# Patient Record
Sex: FEMALE | Race: WHITE | URBAN - METROPOLITAN AREA
[De-identification: names, ages, dates, MRNs, and addresses within clinical notes are randomized per-mention and may not be internally consistent; named-entity substitution may affect disease eponyms.]

---

## 2021-06-17 ENCOUNTER — TELEPHONE (OUTPATIENT)
Dept: SURGERY | Age: 56
End: 2021-06-17

## 2021-06-17 NOTE — TELEPHONE ENCOUNTER
I returned the call mentioned below to the patient. I left a detailed message letting the patient know that we are unable to provide cosmetic pricing without an initial appointment. I will close this phone note.

## 2021-11-22 ENCOUNTER — OFFICE VISIT (OUTPATIENT)
Dept: SURGERY | Age: 56
End: 2021-11-22
Payer: COMMERCIAL

## 2021-11-22 VITALS
DIASTOLIC BLOOD PRESSURE: 92 MMHG | OXYGEN SATURATION: 98 % | HEART RATE: 82 BPM | SYSTOLIC BLOOD PRESSURE: 158 MMHG | HEIGHT: 59 IN | WEIGHT: 164 LBS | BODY MASS INDEX: 33.06 KG/M2

## 2021-11-22 DIAGNOSIS — R10.13 EPIGASTRIC PAIN: ICD-10-CM

## 2021-11-22 DIAGNOSIS — R10.9 ABDOMINAL PAIN, UNSPECIFIED ABDOMINAL LOCATION: ICD-10-CM

## 2021-11-22 DIAGNOSIS — L98.7 EXCESS SKIN OF THIGH: ICD-10-CM

## 2021-11-22 DIAGNOSIS — L98.7 EXCESS SKIN OF ARM: ICD-10-CM

## 2021-11-22 DIAGNOSIS — M79.3 PANNICULITIS: Primary | ICD-10-CM

## 2021-11-22 DIAGNOSIS — N64.81 BREAST PTOSIS: ICD-10-CM

## 2021-11-22 PROCEDURE — 99204 OFFICE O/P NEW MOD 45 MIN: CPT | Performed by: SURGERY

## 2021-11-22 RX ORDER — FUROSEMIDE 20 MG/1
20 TABLET ORAL 2 TIMES DAILY
COMMUNITY
Start: 2021-10-01

## 2021-11-22 RX ORDER — MIDODRINE HYDROCHLORIDE 5 MG/1
TABLET ORAL
Status: ON HOLD | COMMUNITY
Start: 2021-04-16 | End: 2022-10-25

## 2021-11-22 RX ORDER — NYSTATIN 100000 U/G
CREAM TOPICAL
COMMUNITY

## 2021-11-22 RX ORDER — HYDROXYZINE 50 MG/1
50 TABLET, FILM COATED ORAL NIGHTLY
COMMUNITY
Start: 2021-10-31

## 2021-11-22 RX ORDER — NALTREXONE HYDROCHLORIDE 50 MG/1
TABLET, FILM COATED ORAL
COMMUNITY
Start: 2021-09-08 | End: 2022-06-08 | Stop reason: ALTCHOICE

## 2021-11-22 RX ORDER — AZELASTINE HYDROCHLORIDE, FLUTICASONE PROPIONATE 137; 50 UG/1; UG/1
1 SPRAY, METERED NASAL 2 TIMES DAILY PRN
COMMUNITY

## 2021-11-22 RX ORDER — IBUPROFEN 600 MG/1
TABLET ORAL
COMMUNITY
End: 2022-06-08 | Stop reason: ALTCHOICE

## 2021-11-22 RX ORDER — LISDEXAMFETAMINE DIMESYLATE 60 MG/1
CAPSULE ORAL
COMMUNITY
Start: 2021-04-13 | End: 2022-06-08 | Stop reason: ALTCHOICE

## 2021-11-22 RX ORDER — ERGOCALCIFEROL (VITAMIN D2) 1250 MCG
CAPSULE ORAL
COMMUNITY
End: 2022-06-08 | Stop reason: ALTCHOICE

## 2021-11-22 RX ORDER — ALBUTEROL SULFATE 90 UG/1
AEROSOL, METERED RESPIRATORY (INHALATION)
Status: ON HOLD | COMMUNITY
End: 2022-10-28 | Stop reason: SDUPTHER

## 2021-11-22 RX ORDER — OMEPRAZOLE 40 MG/1
CAPSULE, DELAYED RELEASE ORAL
COMMUNITY

## 2021-11-22 RX ORDER — POTASSIUM CHLORIDE 1500 MG/1
20 TABLET, EXTENDED RELEASE ORAL 2 TIMES DAILY
COMMUNITY
Start: 2021-08-26

## 2021-11-22 RX ORDER — ESTRADIOL 1 MG/1
1 TABLET ORAL NIGHTLY
COMMUNITY
Start: 2021-10-19

## 2021-11-22 RX ORDER — VENLAFAXINE HYDROCHLORIDE 150 MG/1
CAPSULE, EXTENDED RELEASE ORAL
COMMUNITY
Start: 2021-04-13 | End: 2022-06-08 | Stop reason: ALTCHOICE

## 2021-11-22 RX ORDER — CETIRIZINE HYDROCHLORIDE 10 MG/1
10 TABLET ORAL DAILY PRN
COMMUNITY

## 2021-11-22 RX ORDER — LEVALBUTEROL INHALATION SOLUTION 0.63 MG/3ML
1 SOLUTION RESPIRATORY (INHALATION) EVERY 6 HOURS PRN
COMMUNITY

## 2021-11-22 RX ORDER — PROGESTERONE 200 MG/1
200 CAPSULE ORAL NIGHTLY
COMMUNITY
Start: 2021-11-10

## 2021-11-22 RX ORDER — FOLIC ACID 1 MG/1
TABLET ORAL
Status: ON HOLD | COMMUNITY
Start: 2021-10-01 | End: 2022-10-25

## 2021-11-22 RX ORDER — PSEUDOEPHEDRINE HCL 120 MG/1
120 TABLET, FILM COATED, EXTENDED RELEASE ORAL 2 TIMES DAILY PRN
COMMUNITY

## 2021-11-22 RX ORDER — PROMETHAZINE HYDROCHLORIDE 12.5 MG/1
TABLET ORAL
Status: ON HOLD | COMMUNITY
Start: 2021-04-16 | End: 2022-10-28 | Stop reason: SDUPTHER

## 2021-11-22 RX ORDER — BUSPIRONE HYDROCHLORIDE 15 MG/1
15 TABLET ORAL 3 TIMES DAILY
COMMUNITY
Start: 2021-04-13

## 2021-11-22 RX ORDER — TRAZODONE HYDROCHLORIDE 100 MG/1
TABLET ORAL
COMMUNITY

## 2021-11-22 RX ORDER — CYCLOBENZAPRINE HCL 10 MG
10 TABLET ORAL 3 TIMES DAILY PRN
Status: ON HOLD | COMMUNITY
Start: 2021-09-29 | End: 2022-10-28 | Stop reason: HOSPADM

## 2021-11-22 NOTE — PROGRESS NOTES
[facility]    Consultation requested by: PCP (Previous nurse at [facility])    CC: Body contouring    HPI: 49 y.o. female with a PMHx as delineated below who presents in consultation for body contouring. She underwent a sleeve gastrectomy with [facility] in [facility] [place] ([id number]). Since her sleeve, she has had problems with excess skin of her abdomen. She has problems with rashes beneath breasts and her abdomen. She also notes rashes behind her knees. She has been placed on both antimicrobial creams as well as PO antibiotics without improvement. She notes that there is discomfort with her skin breakdown as well as a significant odor from her pannus. She is unable to exercise due to this. Given these issues, she was referred to plastic surgery for evaluation. She notes that her breasts are also deflated in appearance and also has issues with her arms and thighs from an excess skin perspective.     Bariatric surgery: Yes / date: 2015 (Type: sleeve gastrectomy)    Max weight (lbs): 330  Lowest weight (lbs): 153  Current (lbs): 164  Weight change in the past 3 months: No  Max BMI: 55  Current BMI: 33.1    History of DVT/PE: No  Excess skin cover genitals: Yes    Previous body contouring procedures: No  Smoking: No    Last Mammogram: 1/20 (per patient)    Current bra size: 38B/C  Desired bra size: Same size  Pregnancies/miscarriages: 4 / 0  Breast feeding: no future plans    PMHx:  Depression, asthma, peripheral edema / heart murmur (on Lasix),  GERD  PSHx: Carcinoid resection with right hemicolectomy (Dr. Marce Guerrero), Incisional hernia repair Claude Link), Sleeve gastrectomy (2015),  Laparscopic cholecystectomy (2017)    MEDS: Effexor, Trazodone, Sudafed, Phenergan, Progesterone, Prilosec, Nystatin, Naltrexone, Midodrine, Vyvanse, Levalbuterol, Lasix, Flexeril, Zyrtec    Social History     Socioeconomic History    Marital status:      Spouse name: Not on file    Number of children: Not on file    Years of education: Not on file    Highest education level: Not on file   Occupational History    Not on file   Tobacco Use    Smoking status: Not on file    Smokeless tobacco: Not on file   Substance and Sexual Activity    Alcohol use: Not on file    Drug use: Not on file    Sexual activity: Not on file   Other Topics Concern    Not on file   Social History Narrative    Not on file     Social Determinants of Health     Financial Resource Strain:     Difficulty of Paying Living Expenses: Not on file   Food Insecurity:     Worried About Running Out of Food in the Last Year: Not on file    Leticia of Food in the Last Year: Not on file   Transportation Needs:     Lack of Transportation (Medical): Not on file    Lack of Transportation (Non-Medical): Not on file   Physical Activity:     Days of Exercise per Week: Not on file    Minutes of Exercise per Session: Not on file   Stress:     Feeling of Stress : Not on file   Social Connections:     Frequency of Communication with Friends and Family: Not on file    Frequency of Social Gatherings with Friends and Family: Not on file    Attends Orthodox Services: Not on file    Active Member of [employer] or Organizations: Not on file    Attends Club or Organization Meetings: Not on file    Marital Status: Not on file   Intimate Partner Violence:     Fear of Current or Ex-Partner: Not on file    Emotionally Abused: Not on file    Physically Abused: Not on file    Sexually Abused: Not on file   Housing Stability:     Unable to Pay for Housing in the Last Year: Not on file    Number of [place] in the Last Year: Not on file    Unstable Housing in the Last Year: Not on file     No family history on file.     Allergy: Not on File    ROS History obtained from the patient  General ROS: negative  Psychological ROS: negative  Ophthalmic ROS: negative  Neurological ROS: negative  ENT ROS: negative  Allergy and Immunology ROS: negative  Hematological and Lymphatic ROS: negative  Endocrine ROS: negative  Respiratory ROS: negative  Cardiovascular ROS: negative  Gastrointestinal ROS: See HPI  Genito-Urinary ROS: negative  Musculoskeletal ROS: negative   Skin ROS: See HPI. EXAM    BP (!) 158/92   Pulse 82   Ht 4' 11\" (1.499 m)   Wt 164 lb (74.4 kg)   SpO2 98%   BMI 33.12 kg/m²     GEN: NAD, pleasant, obese/healthy  CVS: RRR  PULM: No respiratory distress  HEENT: PERRLA/EOMI; hearing appears within normal limits  NECK: Supple with trachea in midline, no masses  BREAST: Left larger than Right   R  Ptosis thgthrthathdtheth:th th4th Palpable masses: No     Nipple retraction: No     Palpable axillary lymphadenopathy: No     SN-N: 25.7 cm     N-IMF: 6.8 cm     Breast width: 13.2 cm        L  Ptosis thgthrthathdtheth:th th4th Palpable masses: No     Nipple retraction: No     Palpable axillary lymphadenopathy: No     SN-N: 28 cm     N-IMF: 7.6 cm     Breast width: 13.5 cm  ABD: soft/NT/ND  Excess skin in both horizontal and vertical dimensions  Apparent palpable hernia defect in the midline  Skin breakdown below grade 2 pannus  EXT: No lymphedema noted; grade 2B skin excess of the upper arms and skin breakdown of the inner thighs  NEURO: No focal deficits, no obvious CN deficits    IMP: 49 y.o. female with panniculitis and desire for body contouring  PLAN: Given her extensive amount of weight loss, would benefit from a staged approach beginning first with panniculectomy and mastopexy for functional improvement. Would additionally perform an FdL abdominoplasty at the same time as well as potential hernia repair (if required). Will need CT to evaluate defect given her multiple previous abdominal surgeries. Once completed and healed, will then plan for brachioplasty followed by inner thigh lifting. Will obtain CT imaging and then submit to insurance and schedule. Will need IM, cards, and pulm clearance as well.     The complexity of body contouring procedures and wound healing physiology were discussed with the patient. She was counseled on ideal medical optimization (nutrition, weight stability, etc.). We also discussed the pros & cons of staging for multiple procedures including controlling tension from various sites and postoperative care managment. Clinical photos were obtained. The risks, benefits, alternatives, outcomes, personnel involved were discussed and all questions were answered in a satisfactory manner according to the patient. Specifically,the risks including, but not limited to: bleeding possibly requiring transfusion or reoperation, infection, seroma, nonhealing of wounds, poor cosmetic outcome, scarring, VTE (DVT/PE), and death were discussed. The patient was counseled at length about the risks of charan Covid-19 during their perioperative period and any recovery window from their procedure. The patient was made aware that charan Covid-19  may worsen their prognosis for recovering from their procedure  and lend to a higher morbidity and/or mortality risk. All material risks, benefits, and reasonable alternatives including postponing the procedure were discussed. The patient does wish to proceed with the procedure at this time.     Sharon Dove MD  [address] [place] [address] Reconstructive Surgery  [phone]  11/22/21

## 2021-11-30 ENCOUNTER — TELEPHONE (OUTPATIENT)
Dept: SURGERY | Age: 56
End: 2021-11-30

## 2021-11-30 NOTE — TELEPHONE ENCOUNTER
CT of Abd/Pelvis was ordered at patient's visit CPT code 98778 for abdominal pain       Approved: Case Number: [id number] 11/30/21-1/29/2022    Approval for imaging was for [facility] of [address] NPI [id number]. Called patient and LM with approval info so she may schedule with U jomar CHAVEZ.

## 2021-12-16 ENCOUNTER — TELEPHONE (OUTPATIENT)
Dept: SURGERY | Age: 56
End: 2021-12-16

## 2021-12-16 NOTE — TELEPHONE ENCOUNTER
Pt called in to advise that she had her CT at the [facility]. She would like to know if Dr. Winnie Willson is able to see it in [place].      Please call her back and let her know

## 2021-12-22 NOTE — TELEPHONE ENCOUNTER
I returned the patients call mentioned below. The patient stated that she has the disk and the report in her possession and will mail them to our office between now and the new year. I will close this phone note.

## 2022-02-11 ENCOUNTER — TELEPHONE (OUTPATIENT)
Dept: SURGERY | Age: 57
End: 2022-02-11

## 2022-02-11 NOTE — TELEPHONE ENCOUNTER
Reviewed her CT scan. She has a hernia defect that will require an extensive repair given her mesh. I will need to talk to her about the repair required and will need to do it with Dr. Val Pineda. If she wants to meet in person or virtually that works for me. We can then get her scheduled for hernia repair, panniculectomy, and abdominoplasty as desired. Thanks!   Skylar Boland

## 2022-02-11 NOTE — TELEPHONE ENCOUNTER
Pt called checking on status of review of CT scan disk that was sent. She was advised it was received and was given to radiology to be uploaded. It is now waiting for Dr. Araceli Deras to review it. She requests a call once it is reviewed. [phone].

## 2022-02-11 NOTE — TELEPHONE ENCOUNTER
I have received the reports to this image, they are in media tab. And disc was taken to radiology to be uploaded to PACS. Routing to MD to review images and give next steps.

## 2022-03-09 ENCOUNTER — INITIAL CONSULT (OUTPATIENT)
Dept: BARIATRICS/WEIGHT MGMT | Age: 57
End: 2022-03-09

## 2022-03-09 ENCOUNTER — OFFICE VISIT (OUTPATIENT)
Dept: SURGERY | Age: 57
End: 2022-03-09
Payer: COMMERCIAL

## 2022-03-09 VITALS
WEIGHT: 171 LBS | HEART RATE: 82 BPM | BODY MASS INDEX: 34.54 KG/M2 | DIASTOLIC BLOOD PRESSURE: 76 MMHG | TEMPERATURE: 98.1 F | OXYGEN SATURATION: 99 % | SYSTOLIC BLOOD PRESSURE: 127 MMHG

## 2022-03-09 VITALS
HEIGHT: 59 IN | WEIGHT: 171 LBS | BODY MASS INDEX: 34.47 KG/M2 | DIASTOLIC BLOOD PRESSURE: 76 MMHG | SYSTOLIC BLOOD PRESSURE: 127 MMHG | HEART RATE: 82 BPM

## 2022-03-09 DIAGNOSIS — Z98.84 S/P GASTRIC BYPASS: ICD-10-CM

## 2022-03-09 DIAGNOSIS — M79.3 PANNICULITIS: Primary | ICD-10-CM

## 2022-03-09 DIAGNOSIS — K46.9 RECURRENT HERNIA: ICD-10-CM

## 2022-03-09 DIAGNOSIS — R10.9 ABDOMINAL PAIN, UNSPECIFIED ABDOMINAL LOCATION: ICD-10-CM

## 2022-03-09 DIAGNOSIS — E66.9 OBESITY (BMI 30.0-34.9): Primary | ICD-10-CM

## 2022-03-09 DIAGNOSIS — N64.81 BREAST PTOSIS: ICD-10-CM

## 2022-03-09 DIAGNOSIS — R10.13 EPIGASTRIC PAIN: ICD-10-CM

## 2022-03-09 PROCEDURE — 99213 OFFICE O/P EST LOW 20 MIN: CPT | Performed by: SURGERY

## 2022-03-09 PROCEDURE — 99999 PR OFFICE/OUTPT VISIT,PROCEDURE ONLY: CPT | Performed by: SURGERY

## 2022-03-09 RX ORDER — DISULFIRAM 250 MG/1
TABLET ORAL
COMMUNITY
End: 2022-06-08 | Stop reason: ALTCHOICE

## 2022-03-09 NOTE — Clinical Note
I think with more weight loss (goal 160) and close monitoring, she would be a great TV patient - I don't think it will be horrible.   #TLC

## 2022-03-09 NOTE — PROGRESS NOTES
[facility]    Consultation requested by: PCP (Previous nurse at [facility])    CC: Body contouring    HPI: 49 y.o. female with a PMHx as delineated below who presents in consultation for body contouring. She underwent a sleeve gastrectomy with [facility] in [place] ([id number]). Since her sleeve, she has had problems with excess skin of her abdomen. She has problems with rashes beneath breasts and her abdomen. She also notes rashes behind her knees. She has been placed on both antimicrobial creams as well as PO antibiotics without improvement. She notes that there is discomfort with her skin breakdown as well as a significant odor from her pannus. She is unable to exercise due to this. Given these issues, she was referred to plastic surgery for evaluation. She notes that her breasts are also deflated in appearance and also has issues with her arms and thighs from an excess skin perspective. Since her last evaluation, she has gained some weight and also notes that she has been having some abdominal pain. She is here today to see Dr. Ede Mejia as well for questions regarding her habitus after weight loss surgery.     Bariatric surgery: Yes / date: 2015 (Type: sleeve gastrectomy)    Max weight (lbs): 330  Lowest weight (lbs): 153  Current (lbs): 171 (164)  Weight change in the past 3 months: No  Max BMI: 55  Current BMI: 34.5 (33.1)    History of DVT/PE: No  Excess skin cover genitals: Yes    Previous body contouring procedures: No  Smoking: No    Last Mammogram: 1/20 (per patient)    Current bra size: 38B/C  Desired bra size: Same size  Pregnancies/miscarriages: 4 / 0  Breast feeding: no future plans    PMHx:  Depression, asthma, peripheral edema / heart murmur (on Lasix),  GERD  PSHx: Carcinoid resection with right hemicolectomy (Dr. Devin Escobedo), Incisional hernia repair Roma Goldman), Sleeve gastrectomy (2015),  Laparscopic cholecystectomy (2017)    MEDS: Effexor, Trazodone, Sudafed, Phenergan, Progesterone, Prilosec, Nystatin, Naltrexone, Midodrine, Vyvanse, Levalbuterol, Lasix, Flexeril, Zyrtec    Social History     Socioeconomic History    Marital status:      Spouse name: Not on file    Number of children: Not on file    Years of education: Not on file    Highest education level: Not on file   Occupational History    Not on file   Tobacco Use    Smoking status: Former Smoker     Quit date:      Years since quittin.2    Smokeless tobacco: Never Used   Vaping Use    Vaping Use: Never used   Substance and Sexual Activity    Alcohol use: Not Currently    Drug use: Never    Sexual activity: Not on file   Other Topics Concern    Not on file   Social History Narrative    Not on file     Social Determinants of Health     Financial Resource Strain:     Difficulty of Paying Living Expenses: Not on file   Food Insecurity:     Worried About Running Out of Food in the Last Year: Not on file    Leticia of Food in the Last Year: Not on file   Transportation Needs:     Lack of Transportation (Medical): Not on file    Lack of Transportation (Non-Medical):  Not on file   Physical Activity:     Days of Exercise per Week: Not on file    Minutes of Exercise per Session: Not on file   Stress:     Feeling of Stress : Not on file   Social Connections:     Frequency of Communication with Friends and Family: Not on file    Frequency of Social Gatherings with Friends and Family: Not on file    Attends Denominational Services: Not on file    Active Member of Clubs or Organizations: Not on file    Attends Club or Organization Meetings: Not on file    Marital Status: Not on file   Intimate Partner Violence:     Fear of Current or Ex-Partner: Not on file    Emotionally Abused: Not on file    Physically Abused: Not on file    Sexually Abused: Not on file   Housing Stability:     Unable to Pay for Housing in the Last Year: Not on file    Number of [facility] in the Last Year: Not on file    Unstable Housing in the Last Year: Not on file     No family history on file. Allergy:   Allergies   Allergen Reactions    Singulair [Montelukast] Rash       ROS History obtained from the patient  General ROS: negative  Psychological ROS: negative  Ophthalmic ROS: negative  Neurological ROS: negative  ENT ROS: negative  Allergy and Immunology ROS: negative  Hematological and Lymphatic ROS: negative  Endocrine ROS: negative  Respiratory ROS: negative  Cardiovascular ROS: negative  Gastrointestinal ROS: See HPI  Genito-Urinary ROS: negative  Musculoskeletal ROS: negative   Skin ROS: See HPI. EXAM    /76   Pulse 82   Temp 98.1 °F (36.7 °C)   Wt 171 lb (77.6 kg)   SpO2 99%   BMI 34.54 kg/m²     GEN: NAD, pleasant, obese/healthy  CVS: RRR  PULM: No respiratory distress  HEENT: PERRLA/EOMI; hearing appears within normal limits  NECK: Supple with trachea in midline, no masses  BREAST: Left larger than Right   R  Ptosis ndgndrndanddndend:nd nd2nd Palpable masses: No     Nipple retraction: No     Palpable axillary lymphadenopathy: No     SN-N: 25.7 cm     N-IMF: 6.8 cm     Breast width: 13.2 cm        L  Ptosis ndgndrndanddndend:nd nd2nd Palpable masses: No     Nipple retraction: No     Palpable axillary lymphadenopathy: No     SN-N: 28 cm     N-IMF: 7.6 cm     Breast width: 13.5 cm  ABD: soft/NT/ND  Excess skin in both horizontal and vertical dimensions  Apparent palpable hernia defect in the midline  Skin breakdown below grade 2 pannus  EXT: No lymphedema noted; grade 2B skin excess of the upper arms and skin breakdown of the inner thighs  NEURO: No focal deficits, no obvious CN deficits    CT abdomen reviewed    IMP: 49 y.o. female with panniculitis and desire for body contouring  PLAN: Given her extensive amount of weight loss, would benefit from a staged approach beginning first with panniculectomy and mastopexy for functional improvement.  Would additionally perform an FdL abdominoplasty at the same time as well as potential hernia repair (if required). Once completed and healed, will then plan for brachioplasty followed by inner thigh lifting. Will submit to insurance and schedule. Will need IM, cards, and pulm clearance as well. Will return in a few months for evaluation. The complexity of body contouring procedures and wound healing physiology were discussed with the patient. She was counseled on ideal medical optimization (nutrition, weight stability, etc.). We also discussed the pros & cons of staging for multiple procedures including controlling tension from various sites and postoperative care managment. Clinical photos were obtained. The risks, benefits, alternatives, outcomes, personnel involved were discussed and all questions were answered in a satisfactory manner according to the patient. Specifically,the risks including, but not limited to: bleeding possibly requiring transfusion or reoperation, infection, seroma, nonhealing of wounds, poor cosmetic outcome, scarring, VTE (DVT/PE), and death were discussed. The patient was counseled at length about the risks of charan Covid-19 during their perioperative period and any recovery window from their procedure. The patient was made aware that charan Covid-19  may worsen their prognosis for recovering from their procedure  and lend to a higher morbidity and/or mortality risk. All material risks, benefits, and reasonable alternatives including postponing the procedure were discussed. The patient does wish to proceed with the procedure at this time.     Amee Shahid MD  [address] [place] [address] Reconstructive Surgery  [phone]  03/09/22

## 2022-03-09 NOTE — PROGRESS NOTES
Pt s/p bariatric sx. Reviewed post-op dietary recommendations. Not currently tracking intake - reviewed and provided 1800kcal & 1200kcal LCMP. Encouraged to track at least 3x day, pt likes Lose It Lucia. Reviewed prioritizing protein and produce when eating, meeting 60-80g protein goal daily. Pt currently drinking seltzer and some coke. Advised to avoid carbonation, goal of 48-64oz fluids daily. Reviewed not to exceed 80-100oz. Pt states grazes at times, eating & drinking together at times. Reviewed 30/30/30 and encourage to pace with meals to prevent grazing. Pt taking some MVI and supplements - provided Alternate MVI list + Fusion calcium samples. Overall, encouraged pt to be mindful of food choices and portions as pt has goal of wt loss to ~ 160#. Pt verbalized understanding, questions and concerns addressed.

## 2022-05-27 ENCOUNTER — TELEPHONE (OUTPATIENT)
Dept: SURGERY | Age: 57
End: 2022-05-27

## 2022-05-27 NOTE — TELEPHONE ENCOUNTER
Patient called in because insurance is stating that her CT scan was not covered because it wasn't pre-certified. She mentioned that she thought she remembered someone taking care of the pre-certification for the CT scan because she had to provide the information to the clinical staff.     Please contact the patient at [phone]

## 2022-05-27 NOTE — TELEPHONE ENCOUNTER
I returned the patients call mentioned below. I provided the insurance and APPROVAL information documented in the phone note dated 11-. I will close this phone note.

## 2022-06-08 ENCOUNTER — INITIAL CONSULT (OUTPATIENT)
Dept: BARIATRICS/WEIGHT MGMT | Age: 57
End: 2022-06-08
Payer: COMMERCIAL

## 2022-06-08 ENCOUNTER — OFFICE VISIT (OUTPATIENT)
Dept: SURGERY | Age: 57
End: 2022-06-08
Payer: COMMERCIAL

## 2022-06-08 VITALS
BODY MASS INDEX: 34.27 KG/M2 | SYSTOLIC BLOOD PRESSURE: 114 MMHG | DIASTOLIC BLOOD PRESSURE: 67 MMHG | HEIGHT: 59 IN | WEIGHT: 170 LBS

## 2022-06-08 VITALS
WEIGHT: 170 LBS | HEART RATE: 89 BPM | BODY MASS INDEX: 34.27 KG/M2 | HEIGHT: 59 IN | DIASTOLIC BLOOD PRESSURE: 67 MMHG | SYSTOLIC BLOOD PRESSURE: 114 MMHG

## 2022-06-08 DIAGNOSIS — L98.7 EXCESS SKIN OF THIGH: ICD-10-CM

## 2022-06-08 DIAGNOSIS — Z98.84 S/P GASTRIC BYPASS: ICD-10-CM

## 2022-06-08 DIAGNOSIS — E66.9 OBESITY (BMI 30.0-34.9): ICD-10-CM

## 2022-06-08 DIAGNOSIS — N64.81 BREAST PTOSIS: ICD-10-CM

## 2022-06-08 DIAGNOSIS — K43.0 RECURRENT INCISIONAL HERNIA WITH INCARCERATION: Primary | ICD-10-CM

## 2022-06-08 DIAGNOSIS — R10.9 ABDOMINAL PAIN, UNSPECIFIED ABDOMINAL LOCATION: ICD-10-CM

## 2022-06-08 DIAGNOSIS — L98.7 EXCESS SKIN OF ARM: ICD-10-CM

## 2022-06-08 DIAGNOSIS — M79.3 PANNICULITIS: Primary | ICD-10-CM

## 2022-06-08 PROCEDURE — 99204 OFFICE O/P NEW MOD 45 MIN: CPT | Performed by: SURGERY

## 2022-06-08 PROCEDURE — 99213 OFFICE O/P EST LOW 20 MIN: CPT | Performed by: SURGERY

## 2022-06-08 NOTE — PROGRESS NOTES
[facility]    Consultation requested by: PCP (Previous nurse at [facility])    CC: Body contouring    HPI: 56 y.o. female with a PMHx as delineated below who presents in consultation for body contouring. She underwent a sleeve gastrectomy with [facility] in [facility] [employer] ([id number]). Since her sleeve, she has had problems with excess skin of her abdomen. She has problems with rashes beneath breasts and her abdomen. She also notes rashes behind her knees. She has been placed on both antimicrobial creams as well as PO antibiotics without improvement. She notes that there is discomfort with her skin breakdown as well as a significant odor from her pannus. She is unable to exercise due to this. Given these issues, she was referred to plastic surgery for evaluation. She notes that her breasts are also deflated in appearance and also has issues with her arms and thighs from an excess skin perspective. She has gained some weight and also notes that she has been having some abdominal pain. She is here today to see Dr. Colleen Em as well for questions regarding her habitus after weight loss surgery. Since her last evaluation, she has noted some fluid in her lower extremities.     Bariatric surgery: Yes / date: 2015 (Type: sleeve gastrectomy)    Max weight (lbs): 330  Lowest weight (lbs): 153  Current (lbs): 170 (171, 164)  Weight change in the past 3 months: No  Max BMI: 55  Current BMI: 34.4 (34.5, 33.1)    History of DVT/PE: No  Excess skin cover genitals: Yes    Previous body contouring procedures: No  Smoking: No    Last Mammogram: 1/20 (per patient)    Current bra size: 38B/C  Desired bra size: Same size  Pregnancies/miscarriages: 4 / 0  Breast feeding: no future plans    PMHx:  Depression, asthma, peripheral edema / heart murmur (on Lasix),  GERD  PSHx: Carcinoid resection with right hemicolectomy (Dr. Brina Tyson), Incisional hernia repair Cassandra Yen), Sleeve gastrectomy (2015), Laparscopic cholecystectomy (2017)    MEDS: Effexor, Trazodone, Sudafed, Phenergan, Progesterone, Prilosec, Nystatin, Naltrexone, Midodrine, Vyvanse, Levalbuterol, Lasix, Flexeril, Zyrtec    Social History     Socioeconomic History    Marital status:      Spouse name: Not on file    Number of children: Not on file    Years of education: Not on file    Highest education level: Not on file   Occupational History    Not on file   Tobacco Use    Smoking status: Former Smoker     Quit date:      Years since quittin.4    Smokeless tobacco: Never Used   Vaping Use    Vaping Use: Never used   Substance and Sexual Activity    Alcohol use: Not Currently    Drug use: Never    Sexual activity: Not on file   Other Topics Concern    Not on file   Social History Narrative    Not on file     Social Determinants of Health     Financial Resource Strain:     Difficulty of Paying Living Expenses: Not on file   Food Insecurity:     Worried About Running Out of Food in the Last Year: Not on file    Leticia of Food in the Last Year: Not on file   Transportation Needs:     Lack of Transportation (Medical): Not on file    Lack of Transportation (Non-Medical):  Not on file   Physical Activity:     Days of Exercise per Week: Not on file    Minutes of Exercise per Session: Not on file   Stress:     Feeling of Stress : Not on file   Social Connections:     Frequency of Communication with Friends and Family: Not on file    Frequency of Social Gatherings with Friends and Family: Not on file    Attends Confucianism Services: Not on file    Active Member of Clubs or Organizations: Not on file    Attends Club or Organization Meetings: Not on file    Marital Status: Not on file   Intimate Partner Violence:     Fear of Current or Ex-Partner: Not on file    Emotionally Abused: Not on file    Physically Abused: Not on file    Sexually Abused: Not on file   Housing Stability:     Unable to Pay for Housing in the Last Year: Not on file    Number of Places Lived in the Last Year: Not on file    Unstable Housing in the Last Year: Not on file     No family history on file. Allergy:   Allergies   Allergen Reactions    Singulair [Montelukast] Rash       ROS History obtained from the patient  General ROS: negative  Psychological ROS: negative  Ophthalmic ROS: negative  Neurological ROS: negative  ENT ROS: negative  Allergy and Immunology ROS: negative  Hematological and Lymphatic ROS: negative  Endocrine ROS: negative  Respiratory ROS: negative  Cardiovascular ROS: negative  Gastrointestinal ROS: See HPI  Genito-Urinary ROS: negative  Musculoskeletal ROS: negative   Skin ROS: See HPI. EXAM    /67   Ht 4' 11\" (1.499 m)   Wt 170 lb (77.1 kg)   BMI 34.34 kg/m²     GEN: NAD, pleasant, obese/healthy  CVS: RRR  PULM: No respiratory distress  HEENT: PERRLA/EOMI; hearing appears within normal limits  NECK: Supple with trachea in midline, no masses  BREAST: Left larger than Right   R  Ptosis thgthrthathdtheth:th th4th Palpable masses: No     Nipple retraction: No     Palpable axillary lymphadenopathy: No     SN-N: 25.7 cm     N-IMF: 6.8 cm     Breast width: 13.2 cm        L  Ptosis thgthrthathdtheth:th th4th Palpable masses: No     Nipple retraction: No     Palpable axillary lymphadenopathy: No     SN-N: 28 cm     N-IMF: 7.6 cm     Breast width: 13.5 cm  ABD: soft/NT/ND  Excess skin in both horizontal and vertical dimensions  Apparent palpable hernia defect in the midline  Skin breakdown below grade 2 pannus  EXT: No lymphedema noted; grade 2B skin excess of the upper arms and skin breakdown of the inner thighs  NEURO: No focal deficits, no obvious CN deficits    CT abdomen reviewed    IMP: 56 y.o. female with panniculitis and desire for body contouring  PLAN: Given her extensive amount of weight loss, would benefit from a staged approach beginning first with panniculectomy and mastopexy for functional improvement.  Would additionally perform an FdL abdominoplasty at the same time as well as potential hernia repair (if required). Once completed and healed, will then plan for brachioplasty followed by inner thigh lifting. Will submit to insurance and schedule. Will need IM, cards, and pulm clearance as well. Will return in a few months for evaluation. The complexity of body contouring procedures and wound healing physiology were discussed with the patient. She was counseled on ideal medical optimization (nutrition, weight stability, etc.). We also discussed the pros & cons of staging for multiple procedures including controlling tension from various sites and postoperative care managment. Clinical photos were obtained. The risks, benefits, alternatives, outcomes, personnel involved were discussed and all questions were answered in a satisfactory manner according to the patient. Specifically,the risks including, but not limited to: bleeding possibly requiring transfusion or reoperation, infection, seroma, nonhealing of wounds, poor cosmetic outcome, scarring, VTE (DVT/PE), and death were discussed. The patient was counseled at length about the risks of charan Covid-19 during their perioperative period and any recovery window from their procedure. The patient was made aware that charan Covid-19  may worsen their prognosis for recovering from their procedure  and lend to a higher morbidity and/or mortality risk. All material risks, benefits, and reasonable alternatives including postponing the procedure were discussed. The patient does wish to proceed with the procedure at this time.     Patrcia Boast, MD  [address] [place] [address] Reconstructive Surgery  [phone]  06/08/22

## 2022-06-20 NOTE — PROGRESS NOTES
[facility]) Physicians   General & Laparoscopic Surgery  [employer] [facility] [employer]       HPI:    Quang Cortés is very pleasant 56 y.o. female who is referred by Dr. Tia Ritter for consultation with regards abdominal bulge. Patient current lives in [place]. Has had sleeve, conversion to bypass. Has been below 160 in the past. Has gained some about 10 pounds and is confident she can lose this. Patient denies any nausea, vomiting, fevers, chills, shortness of breath, chest pain, cough, constipation or difficulty urinating. Hernia repaired in the past with fractured mesh currently    PMH: GERD    Past Surgical History:   Procedure Laterality Date    GALLBLADDER SURGERY  2017    HEMICOLECTOMY Right 2007    INCISIONAL HERNIA REPAIR  2008    SLEEVE GASTRECTOMY  2015     History reviewed. No pertinent family history. Social History     Tobacco Use    Smoking status: Former Smoker     Quit date:      Years since quittin.4    Smokeless tobacco: Never Used   Substance Use Topics    Alcohol use: Not Currently     I counseled the patient on the importance of not smoking and risks of ETOH. Allergies   Allergen Reactions    Singulair [Montelukast] Rash     Vitals:    22 1049   BP: 114/67   Pulse: 89   Weight: 170 lb (77.1 kg)   Height: 4' 11\" (1.499 m)       Body mass index is 34.34 kg/m².       Current Outpatient Medications:     lisdexamfetamine (VYVANSE) 70 MG capsule, Take 70 mg by mouth every morning., Disp: , Rfl:     albuterol sulfate  (90 Base) MCG/ACT inhaler, albuterol sulfate HFA 90 mcg/actuation aerosol inhaler  INHALE 2 PUFFS BY MOUTH EVERY 4 HOURS AS NEEDED FOR WHEEZE, Disp: , Rfl:     Azelastine-Fluticasone 137-50 MCG/ACT SUSP, azelastine-fluticasone 137 mcg-50 mcg/spray nasal spray, Disp: , Rfl:     busPIRone (BUSPAR) 15 MG tablet, buspirone 15 mg tablet, Disp: , Rfl:     cetirizine (ZYRTEC) 10 MG tablet, cetirizine 10 mg tablet  TAKE 1 TABLET BY MOUTH EVERY DAY, Disp: , Rfl:     cyclobenzaprine (FLEXERIL) 10 MG tablet, , Disp: , Rfl:     estradiol (ESTRACE) 1 MG tablet, , Disp: , Rfl:     folic acid (FOLVITE) 1 MG tablet, , Disp: , Rfl:     furosemide (LASIX) 20 MG tablet, , Disp: , Rfl:     hydrOXYzine (ATARAX) 50 MG tablet, , Disp: , Rfl:     levalbuterol (XOPENEX) 0.63 MG/3ML nebulization, 3 mLs, Disp: , Rfl:     midodrine (PROAMATINE) 5 MG tablet, midodrine 5 mg tablet  TAKE 1 TABLETS (5 MG) BY ORAL ROUTE 3 TIMES PER DAY. IF SYSTOLIC BLOOD PRESSURE IS MORE THAN 130,HOLD.  IF SYSTOLIC BP IS LESS THAN 602 INCREASE THE DOSE TO 10MG PO TID, Disp: , Rfl:     nystatin (MYCOSTATIN) 784491 UNIT/GM cream, nystatin 100,000 unit/gram topical cream  APPLY TO AFFECTED AREA TWICE A DAY, Disp: , Rfl:     omeprazole (PRILOSEC) 40 MG delayed release capsule, omeprazole 40 mg capsule,delayed release  TAKE 1 CAPSULE BY MOUTH EVERY DAY PRN, Disp: , Rfl:     KLOR-CON M20 20 MEQ extended release tablet, , Disp: , Rfl:     progesterone (PROMETRIUM) 200 MG CAPS capsule, , Disp: , Rfl:     promethazine (PHENERGAN) 12.5 MG tablet, promethazine 12.5 mg tablet  TAKE 1 TABLET EVERY 6 HOURS BY ORAL ROUTE AS NEEDED., Disp: , Rfl:     pseudoephedrine (SUDAFED 12 HR) 120 MG extended release tablet, 12 Hour Decongestant  mg tablet,extended release  TAKE 1 TABLET BY MOUTH EVERY 12 HOURS, Disp: , Rfl:     traZODone (DESYREL) 100 MG tablet, trazodone 100 mg tablet  TAKE 1 TABLET BY MOUTH AT BEDTIME, Disp: , Rfl:       Review of Systems - History obtained from the patient  General ROS: negative  Psychological ROS: negative  Ophthalmic ROS: negative  Neurological ROS: negative  ENT ROS: negative  Allergy and Immunology ROS: negative  Hematological and Lymphatic ROS: negative  Endocrine ROS: negative  Respiratory ROS: negative  Cardiovascular ROS: negative  Gastrointestinal ROS: abdominal pain  Genito-Urinary ROS: negative  Musculoskeletal ROS: negative   Skin ROS: negative    Physical Exam Constitutional: Patient is oriented to person, place, and time. Vital signs are normal. Patient  appears well-developed and well-nourished. Patient  is active and cooperative. Non-toxic appearance. No distress. HENT:   Head: Normocephalic and atraumatic. Head is without laceration. Right Ear: External ear normal. No lacerations. No drainage, swelling or tenderness. Left Ear: External ear normal. No lacerations. No drainage, swelling or tenderness. Nose: Nose normal. No nose lacerations or nasal deformity. Mouth/Throat: Uvula is midline, oropharynx is clear and moist and mucous membranes are normal. No oropharyngeal exudate. Eyes: Conjunctivae, EOM and lids are normal. Pupils are equal, round, and reactive to light. Right eye exhibits no discharge. No foreign body present in the right eye. Left eye exhibits no discharge. No foreign body present in the left eye. No scleral icterus. Neck: Trachea normal and normal range of motion. Neck supple. No JVD present. No tracheal tenderness present. Carotid bruit is not present. No rigidity. No tracheal deviation and no edema present. No thyromegaly present. Cardiovascular: Normal rate, regular rhythm, normal heart sounds, intact distal pulses and normal pulses. Pulmonary/Chest: Effort normal and breath sounds normal. No stridor. No respiratory distress. Patient  has no wheezes. Patient has no rales. Patient exhibits no tenderness and no crepitus. Abdominal: Soft. Normal appearance and bowel sounds are normal. Patient exhibits no distension, no abdominal bruit, no ascites and no mass. There is no hepatosplenomegaly. There is no tenderness. There is no rigidity, no rebound, no guarding and no CVA tenderness. Hernia confirmed in the ventral area. Musculoskeletal: Normal range of motion. Patient exhibits no edema or tenderness. Neurological: Patient is alert and oriented to person, place, and time. Patient has normal strength.  Coordination and gait normal. GCS eye subscore is 4. GCS verbal subscore is 5. GCS motor subscore is 6. Skin: Skin is warm and dry. No abrasion and no rash noted. Patient  is not diaphoretic. No cyanosis or erythema. Psychiatric: Patient has a normal mood and affect. speech is normal and behavior is normal. Cognition and memory are normal.         Data  CT scan reviewed personally and discussed with patient        A/P    1- No heavy lifting or straining. 2- Proceed to the ED and call our office if you ever had increased pain at the hernia site, redness, unable to push it back in, unable to pass gas/stool or vomiting. 3- Pre-operative work up ordered for you(labs/x-rays/EKG). 4- Stop taking Blood thinners, Aspirin , Advil/Aleve/ Ibuprofen one week before surgery. 5- F/U with PCP for pre-op Medical Clearance.    6- Plan for Recurrent incisional hernia repair with bilateral posterior component separation in conjunction with Dr. Niko Santana 112 on weight loss  8- Labs ordered- will review next visit  9- Obtain cardiac clearance    Skip Martínez

## 2022-07-26 NOTE — PROGRESS NOTES
hemicolectomy (Dr. Kevin Larsen), Incisional hernia repair Catie Viveros), Sleeve gastrectomy (2015),  Laparscopic cholecystectomy (2017)    MEDS: Effexor, Trazodone, Sudafed, Phenergan, Progesterone, Prilosec, Nystatin, Naltrexone, Midodrine, Vyvanse, Levalbuterol, Lasix, Flexeril, Zyrtec    Social History     Socioeconomic History    Marital status:      Spouse name: Not on file    Number of children: Not on file    Years of education: Not on file    Highest education level: Not on file   Occupational History    Not on file   Tobacco Use    Smoking status: Former     Types: Cigarettes     Quit date: 12     Years since quittin.5    Smokeless tobacco: Never   Vaping Use    Vaping Use: Never used   Substance and Sexual Activity    Alcohol use: Not Currently    Drug use: Never    Sexual activity: Not on file   Other Topics Concern    Not on file   Social History Narrative    Not on file     Social Determinants of Health     Financial Resource Strain: Not on file   Food Insecurity: Not on file   Transportation Needs: Not on file   Physical Activity: Not on file   Stress: Not on file   Social Connections: Not on file   Intimate Partner Violence: Not on file   Housing Stability: Not on file     No family history on file. Allergy:   Allergies   Allergen Reactions    Singulair [Montelukast] Rash       ROS History obtained from the patient  General ROS: negative  Psychological ROS: negative  Ophthalmic ROS: negative  Neurological ROS: negative  ENT ROS: negative  Allergy and Immunology ROS: negative  Hematological and Lymphatic ROS: negative  Endocrine ROS: negative  Respiratory ROS: negative  Cardiovascular ROS: negative  Gastrointestinal ROS: See HPI  Genito-Urinary ROS: negative  Musculoskeletal ROS: negative   Skin ROS: See HPI.     EXAM    /70   Pulse 73   Temp 97.6 °F (36.4 °C)   Wt 163 lb (73.9 kg)   SpO2 96%   BMI 32.92 kg/m²     GEN: NAD, pleasant, obese/healthy  CVS: RRR  PULM: No respiratory distress  HEENT: PERRLA/EOMI; hearing appears within normal limits  NECK: Supple with trachea in midline, no masses  BREAST: Left larger than Right   R  Ptosis ndgndrndanddndend:nd nd2nd Palpable masses: No     Nipple retraction: No     Palpable axillary lymphadenopathy: No     SN-N: 25.7 cm     N-IMF: 6.8 cm     Breast width: 13.2 cm        L  Ptosis ndgndrndanddndend:nd nd2nd Palpable masses: No     Nipple retraction: No     Palpable axillary lymphadenopathy: No     SN-N: 28 cm     N-IMF: 7.6 cm     Breast width: 13.5 cm  ABD: soft/NT/ND  Excess skin in both horizontal and vertical dimensions  Apparent palpable hernia defect in the midline  Skin breakdown below grade 2 pannus  EXT: No lymphedema noted; grade 2B skin excess of the upper arms and skin breakdown of the inner thighs  NEURO: No focal deficits, no obvious CN deficits    CT abdomen reviewed    IMP: 56 y.o. female with panniculitis, desire for body contouring, and recurrent incisional hernia  PLAN: Given her extensive amount of weight loss, would benefit from a staged approach beginning first with panniculectomy and potential FdL abdominoplasty at the same time as well as hernia repair. Once completed and healed, will then plan for brachioplasty followed by inner thigh lifting. Will work with Dr. Trini Silva and cayden. The complexity of body contouring procedures and wound healing physiology were discussed with the patient. She was counseled on ideal medical optimization (nutrition, weight stability, etc.). We also discussed the pros & cons of staging for multiple procedures including controlling tension from various sites and postoperative care managment. Clinical photos were obtained. The risks, benefits, alternatives, outcomes, personnel involved were discussed and all questions were answered in a satisfactory manner according to the patient.  Specifically,the risks including, but not limited to: bleeding possibly requiring transfusion or reoperation, infection, seroma, nonhealing of wounds, poor cosmetic outcome, scarring, VTE (DVT/PE), and death were discussed.       Tori Leyva MD  [address] [place] [address] 399 Reconstructive Surgery  [phone]  07/27/22

## 2022-07-27 ENCOUNTER — OFFICE VISIT (OUTPATIENT)
Dept: SURGERY | Age: 57
End: 2022-07-27
Payer: COMMERCIAL

## 2022-07-27 ENCOUNTER — INITIAL CONSULT (OUTPATIENT)
Dept: BARIATRICS/WEIGHT MGMT | Age: 57
End: 2022-07-27

## 2022-07-27 VITALS
HEART RATE: 73 BPM | BODY MASS INDEX: 32.86 KG/M2 | HEIGHT: 59 IN | SYSTOLIC BLOOD PRESSURE: 107 MMHG | DIASTOLIC BLOOD PRESSURE: 70 MMHG | WEIGHT: 163 LBS

## 2022-07-27 VITALS
WEIGHT: 163 LBS | TEMPERATURE: 97.6 F | DIASTOLIC BLOOD PRESSURE: 70 MMHG | BODY MASS INDEX: 32.92 KG/M2 | OXYGEN SATURATION: 96 % | SYSTOLIC BLOOD PRESSURE: 107 MMHG | HEART RATE: 73 BPM

## 2022-07-27 DIAGNOSIS — K43.2 RECURRENT INCISIONAL HERNIA: ICD-10-CM

## 2022-07-27 DIAGNOSIS — M79.3 PANNICULITIS: Primary | ICD-10-CM

## 2022-07-27 DIAGNOSIS — K43.0 RECURRENT INCISIONAL HERNIA WITH INCARCERATION: Primary | ICD-10-CM

## 2022-07-27 PROCEDURE — 99999 PR OFFICE/OUTPT VISIT,PROCEDURE ONLY: CPT | Performed by: SURGERY

## 2022-07-27 PROCEDURE — 99214 OFFICE O/P EST MOD 30 MIN: CPT | Performed by: SURGERY

## 2022-07-27 NOTE — LETTER
Surgery Schedule Request Form  [facility]  [address] [place] [address] Savannah Hashimoto. Natalie, [address]  DATE OF SURGERY: 10-     TIME OF SURGERY: 5:30 am      Confirmation#:__________________        Surgeon Name: Pedro Quezada MD    Phone: [phone]     Fax: [phone]  Co-Case Additional Surgeon: Jeff Uriostegui DO  Procedure Name: 1) Open, recurrent, incarcerated, incisional hernia repair with bilateral component separation           2) Possible bilateral transversus abdominis release           3) Panniculectomy           4) Possible [id number] abdominoplasty             CPT CODES (required for scheduling): [phone], 52944, 61797, MISCABD   DIAGNOSIS: K43.2, M79.3  Recurrent Incisional Hernia, Panniculitis    LENGTH OF PROCEDURE: 8 hours  Patient Status: Admit    Labs Needed:   CBC ___  PT/PTT___ INR ____  CMP ___ EKG ___   Urine Hcg ___            PATIENT NAME: Bal Walls            YOB: 1965  SEX: female   SS #:   PHONE: [phone] (home)     Pre-Op to be done by: PCP  Cardiac Clearance Done by: per PCP    Pt Position:  supine  Patient to meet with Anesthesiology prior to surgery: no     Medications to be stopped 5 days before surgery: anticoagulation     Ancef 2 gm IV OCTOR (NOTE:  If patient weight is > 120 kg, Administer 3 gm)  Other Orders: 1) Please have 2 large bore peripheral IVs placed (only contraindication if patient with prior mastectomy with axillary lymph node dissection). 2) Please place a binder on the OR table prior to patient entering.   Cover the binder with two layers of Chux dressings and tuck into the bed  3) Please ensure proper offloading of pressure (including pillow under knees & padding on ankles)  4) Please place a Lo catheter  5) Please have triple antibiotic solution arranged (1g Ancef, 12919r Bacitracin, 80mg Gentamycin in 1L) x 2L  6) Heparin 5000u subcutaneous (please ensure concentrated amount given prior to induction and not in the abdomen)  7) TXA 1g OCTOR    Ancef 2gm IV OCTOR (If patient weight is > 120 kg, give 3 gm IV OCTOR). If PCN allergy, then Clindamycin 600mg IV OCTOR. If prior history of MRSA, Vancomycin 1g IV OCTOR (please check with renal function prior to administration)     INSURANCE: [employer]                                               SUBSCRIBER NAME: Self  MEMBER ID:  [id number]                             AUTHORIZATION #: [id number]    PCP: [facility]                                       ANESTHESIA:  Jesse Panchal MD                             FAX TO: [phone]   QUESTIONS? CALL: Tino [phone]   Fax   [phone]            Date Of Procedure: 10-    PATIENT:       Schuyler Ellison                    :  1965        Allergies   Allergen Reactions    Singulair [Montelukast] Rash       No.   PHYSICIAN ORDERS   HUC/  RN      ORDERS WITH CHECK BOXES MUST BE SELECTED. ALL OTHER ORDERS WILL BE AUTOMATICALLY INITIATED. Date / Time of Order:   22   2:25 PM          Procedure:  1) Open, recurrent, incarcerated, incisional hernia repair with bilateral component separation           2) Possible bilateral transversus abdominis release           3) Panniculectomy           4) Possible [id number] abdominoplasty         1. Cefazolin (Ancef) 2 gm IV OCTOR   ** NOTE:  If patient weight is > 120 kg, Administer 3 gm         2. ** If PCN allergy, then Clindamycin 600mg IV OCTOR.   ** If prior history of MRSA, Vancomycin 1g IV OCTOR (please check with renal function prior to administration)       3. Other orders: 1) Please have 2 large bore peripheral IVs placed (only contraindication if patient with prior mastectomy with axillary lymph node dissection). 2) Please place a binder on the OR table prior to patient entering.   Cover the binder with two layers of Chux dressings and tuck into the bed  3) Please ensure proper offloading of pressure (including pillow under knees & padding on ankles)  4) Please place a Lo catheter  5) Please have triple antibiotic solution arranged (1g Ancef, 34477l Bacitracin, 80mg Gentamycin in 1L) x 2L  6) Heparin 5000u subcutaneous (please ensure concentrated amount given prior to induction and not in the abdomen)  7) TXA 1g OCTOR         Physician / Surgeon:  Rupal Longo MD  Office Ph:  [phone]       Physician Signature:     9/07

## 2022-07-28 ENCOUNTER — TELEPHONE (OUTPATIENT)
Dept: SURGERY | Age: 57
End: 2022-07-28

## 2022-08-05 NOTE — TELEPHONE ENCOUNTER
I received a fax from [facility] dated 8-4-2022. I will scan the fax into Epic under the media tab. Reference ID [id number]  APPROVED - Inpatient Hospitalization     I spoke with Gilma Weinstein at [facility] ([phone]) to discuss Reference ID [id number]. The CPT Codes that I submitted are not listed on the letter that I received. She stated that the codes are listed under this Reference ID. She stated that CPT Codes 78765 and 21  do not require authorization and that CPT Code 45728 has been APPROVED. Call Reference # [id number]     I spoke with Shawn Alva at [facility] ([phone]) to change the admission date on Reference ID [id number] to 10-. DONE. Call Reference # [id number]    I spoke with the patient at the home number listed to discuss the insurance approval, scheduling and cosmetic pricing. She will speak with her  and call me back to let me know how she would like to move forward. I will leave this phone note open.

## 2022-08-08 NOTE — TELEPHONE ENCOUNTER
The patient called to let me know that she would like to move forward with the possible [id number] abdominoplasty. I will scan the cosmetic surgery quote into Epic under the media tab. The patient is now scheduled for surgery with  on 10-. The patient is aware of H&P. The patient will call to schedule her post op appointment once she is discharged from [facility]. I will submit the surgery letter today. I will mail the surgery information, instructions and cosmetic surgery quote to the patient today. I will close this phone note.

## 2022-08-15 NOTE — TELEPHONE ENCOUNTER
I spoke with Amanda Rodriguez at [address] [place] [address] ([phone]) to change the admission date on Reference ID [id number] to 10-. DONE. Call Reference # [id number]    I will close this phone note.

## 2022-08-16 ENCOUNTER — TELEPHONE (OUTPATIENT)
Dept: BARIATRICS/WEIGHT MGMT | Age: 57
End: 2022-08-16

## 2022-08-16 NOTE — TELEPHONE ENCOUNTER
Spoke with pt to notify her the surgery date had been changed to 10/24/22, arrival at 5:30 am. Pt asked if [facility] paperwork had been received, and since it has not, she will have it re-sent to the office. Pt advised Dr. Author Tafyoa team confirmed they will complete once received. Pt requested this writer to call her HR dept and notify them of the date change if needed. Pt advised this writer is happy to provide a call or letter for documentation as requested. Pt will follow up to let the office know for certain. Pt is asking for new surgery packet from the office. Pt advised she will receive a new one in the mail.

## 2022-08-18 NOTE — TELEPHONE ENCOUNTER
I spoke with the patient at the home number listed to roma her know that her FMLA paperwork was faxed in at the end of the year yesterday. I will close this phone note.

## 2022-09-22 ENCOUNTER — TELEPHONE (OUTPATIENT)
Dept: SURGERY | Age: 57
End: 2022-09-22

## 2022-09-22 NOTE — TELEPHONE ENCOUNTER
I returned the patients call mentioned below. I lmom for the patient on the home number listed. I requested a call back to discuss her message below. I will leave this phone note open.

## 2022-09-22 NOTE — TELEPHONE ENCOUNTER
Patient called to discuss her upcoming surgery. She also has an insurance change.     Please call: [phone]

## 2022-09-22 NOTE — TELEPHONE ENCOUNTER
The patient called to let me know that she spoke with her HR department and her plan will be terminated 9-. I will reach out to her husbands plan 10-1-2022. I will leave this phone note open.

## 2022-09-22 NOTE — TELEPHONE ENCOUNTER
I spoke with the patient at the home number listed. She stated that her Ingrid Garciay will be terminating and would like me to pre certify here upcoming surgery with her husbands insurance. The patient is aware that I am not able to do that until her plan has been terminated. Fela does not currently have a termination date on file. She will reach out to her HR department and call me back. I will leave this phone note open.

## 2022-10-03 NOTE — TELEPHONE ENCOUNTER
I spoke with Hermila PETERSON at [facility] ([phone]) to see if CPT Code 66 [phone] 75, 08904 or 04511 require pre certification. The codes do require pre certification due to ADMIT, which I started during our call. I will fax clinicals to [phone]. I will scan the information that I faxed and the fax success into Epic under the media tab, but I am not able to scan colored pictures. Date Range 10-. Case Reference # [id number]    I spoke with the patient at the home number listed to provide an insurance update. I will leave this phone note open.

## 2022-10-18 NOTE — TELEPHONE ENCOUNTER
I spoke with Shira THOMAS at [facility] ([phone]) to follow up on Case Reference # [id number]. Clinicals were received on 10-3-2022. PENDING     I will leave this phone note open.

## 2022-10-19 NOTE — TELEPHONE ENCOUNTER
I received a fax from [address] [place] [address] dated 10-. I will scan the fax into Epic under the media tab. APPROVED  Reference Number [id number]  CPT Code 42973, 08908, [id number]  Inpatient Stay   Date Range 10-    I lmom for the patient at the home number listed to provide an insurance update. I will close this phone note.

## 2022-10-20 RX ORDER — BUPROPION HYDROCHLORIDE 150 MG/1
TABLET ORAL
COMMUNITY
Start: 2022-07-12

## 2022-10-20 RX ORDER — FAMOTIDINE 20 MG/1
TABLET, FILM COATED ORAL
COMMUNITY
Start: 2022-07-12

## 2022-10-20 RX ORDER — PHENTERMINE AND TOPIRAMATE 3.75; 23 MG/1; MG/1
CAPSULE, EXTENDED RELEASE ORAL
Status: ON HOLD | COMMUNITY
Start: 2022-08-26 | End: 2022-10-25

## 2022-10-20 RX ORDER — PROPRANOLOL HYDROCHLORIDE 20 MG/1
20 TABLET ORAL 3 TIMES DAILY
Status: ON HOLD | COMMUNITY
End: 2022-10-24

## 2022-10-20 NOTE — PROGRESS NOTES
Place patient label inside box (if no patient label, complete below)  Name:  :  MR#:   Jim Cao / PROCEDURE  I (we) Cyndy Goins (Patient Name) authorize Brenda Calles MD (Provider / Rodri ) and/or such assistants as may be selected by him/her, to perform the following operation/procedure(s): OPEN, RECURRENT, INCARCERATED, INCISIONAL HERNIA REPAIR WITH BILATERAL COMPONENT SEPARATION. POSSIBLE BILATERAL TRANSVERSUS ABDOMINIS RELEASE. PANNICULECTOMY. POSSIBLE [id number] ABDOMINOPLASTY       Note: If unable to obtain consent prior to an emergent procedure, document the emergent reason in the medical record. This procedure has been explained to my (our) satisfaction and included in the explanation was: The intended benefit, nature, and extent of the procedure to be performed; The significant risks involved and the probability of success; Alternative procedures and methods of treatment; The dangers and probable consequences of such alternatives (including no procedure or treatment); The expected consequences of the procedure on my future health; Whether other qualified individuals would be performing important surgical tasks and/or whether  would be present to advise or support the procedure. I (we) understand that there are other risks of infection and other serious complications in the pre-operative/procedural and postoperative/procedural stages of my (our) care. I (we) have asked all of the questions which I (we) thought were important in deciding whether or not to undergo treatment or diagnosis. These questions have been answered to my (our) satisfaction. I (we) understand that no assurance can be given that the procedure will be a success, and no guarantee or warranty of success has been given to me (us).     It has been explained to me (us) that during the course of the operation/procedure, unforeseen conditions may be revealed that necessitate extension of the original procedure(s) or different procedure(s) than those set forth in Paragraph 1. I (we) authorize and request that the above-named physician, his/her assistants or his/her designees, perform procedures as necessary and desirable if deemed to be in my (our) best interest.     Revised 8/2/2021                                                                          Page 1 of 2       I acknowledge that health care personnel may be observing this procedure for the purpose of medical education or other specified purposes as may be necessary as requested and/or approved by my (our) physician. I (we) consent to the disposal by the hospital Pathologist of the removed tissue, parts or organs in accordance with hospital policy. I do ____ do not ____ consent to the use of a local infiltration pain blocking agent that will be used by my provider/surgical provider to help alleviate pain during my procedure. I do ____ do not ____ consent to an emergent blood transfusion in the case of a life-threatening situation that requires blood components to be administered. This consent is valid for 24 hours from the beginning of the procedure. This patient does ____ or does not ____ currently have a DNR status/order. If DNR order is in place, obtain Addendum to the Surgical Consent for ALL Patients with a DNR Order to address kelton-operative status for limited intervention or DNR suspension.      I have read and fully understand the above Consent for Operation/Procedure and that all blanks were completed before I signed the consent.   _____________________________       _____________________      ____/____am/pm  Signature of Patient or legal representative      Printed Name / Relationship            Date / Time   ____________________________       _____________________      ____/____am/pm  Witness to Signature                                    Printed Name                    Date / Time    If patient is unable to sign or is a minor, complete the following)  Patient is a minor, ____ years of age, or unable to sign because:   ______________________________________________________________________________________________    If a phone consent is obtained, consent will be documented by using two health care professionals, each affirming that the consenting party has no questions and gives consent for the procedure discussed with the physician/provider.   _____________________          ____________________       _____/_____am/pm   2nd witness to phone consent        Printed name           Date / Time    Informed Consent:  I have provided the explanation described above in section 1 to the patient and/or legal representative.  I have provided the patient and/or legal representative with an opportunity to ask any questions about the proposed operation/procedure.   ___________________________          ____________________         ____/____am/pm  Provider / Proceduralist                            Printed name            Date / Time  Revised 8/2/2021                                                                      Page 2 of 2

## 2022-10-20 NOTE — PROGRESS NOTES
Requested EKG tracing from office of Dr. Jason Hogue [phone] spoke with [facility] and she will fax. -db    10/21/22 @ [phone] with Charmaine at cardiology office and she is trying to fax EKG tracing ,but right fax may be down.   Xin Burdick

## 2022-10-20 NOTE — PROGRESS NOTES
Place patient label inside box (if no patient label, complete below)  Name:  :  MR#:   Danilo Teixeira / PROCEDURE  I (we), Leslee Benitez (Patient Name) authorize Nanette Graham DO  (Provider / Ole Timothy) and/or such assistants as may be selected by him/her, to perform the following operation/procedure(s): OPEN, RECURRENT, INCARCERATED, INCISIONAL HERNIA REPAIR WITH BILATERAL COMPONENT SEPARATION, POSSIBLE BILATERAL, TRANSVERSUS ABDOMNIS RELEASE        Note: If unable to obtain consent prior to an emergent procedure, document the emergent reason in the medical record. This procedure has been explained to my (our) satisfaction and included in the explanation was: The intended benefit, nature, and extent of the procedure to be performed; The significant risks involved and the probability of success; Alternative procedures and methods of treatment; The dangers and probable consequences of such alternatives (including no procedure or treatment); The expected consequences of the procedure on my future health; Whether other qualified individuals would be performing important surgical tasks and/or whether  would be present to advise or support the procedure. I (we) understand that there are other risks of infection and other serious complications in the pre-operative/procedural and postoperative/procedural stages of my (our) care. I (we) have asked all of the questions which I (we) thought were important in deciding whether or not to undergo treatment or diagnosis. These questions have been answered to my (our) satisfaction. I (we) understand that no assurance can be given that the procedure will be a success, and no guarantee or warranty of success has been given to me (us).     It has been explained to me (us) that during the course of the operation/procedure, unforeseen conditions may be revealed that necessitate extension of the original procedure(s) or different procedure(s) than those set forth in Paragraph 1. I (we) authorize and request that the above-named physician, his/her assistants or his/her designees, perform procedures as necessary and desirable if deemed to be in my (our) best interest.     Revised 8/2/2021                                                                          Page 1 of 2       I acknowledge that health care personnel may be observing this procedure for the purpose of medical education or other specified purposes as may be necessary as requested and/or approved by my (our) physician. I (we) consent to the disposal by the hospital Pathologist of the removed tissue, parts or organs in accordance with hospital policy. I do ____ do not ____ consent to the use of a local infiltration pain blocking agent that will be used by my provider/surgical provider to help alleviate pain during my procedure. I do ____ do not ____ consent to an emergent blood transfusion in the case of a life-threatening situation that requires blood components to be administered. This consent is valid for 24 hours from the beginning of the procedure. This patient does ____ or does not ____ currently have a DNR status/order. If DNR order is in place, obtain Addendum to the Surgical Consent for ALL Patients with a DNR Order to address kelton-operative status for limited intervention or DNR suspension.      I have read and fully understand the above Consent for Operation/Procedure and that all blanks were completed before I signed the consent.   _____________________________       _____________________      ____/____am/pm  Signature of Patient or legal representative      Printed Name / Relationship            Date / Time   ____________________________       _____________________      ____/____am/pm  Witness to Signature                                    Printed Name                    Date / Time    If patient is unable to sign or is a minor, complete the following)  Patient is a minor, ____ years of age, or unable to sign because:   ______________________________________________________________________________________________    If a phone consent is obtained, consent will be documented by using two health care professionals, each affirming that the consenting party has no questions and gives consent for the procedure discussed with the physician/provider.   _____________________          ____________________       _____/_____am/pm   2nd witness to phone consent        Printed name           Date / Time    Informed Consent:  I have provided the explanation described above in section 1 to the patient and/or legal representative.  I have provided the patient and/or legal representative with an opportunity to ask any questions about the proposed operation/procedure.   ___________________________          ____________________         ____/____am/pm  Provider / Proceduralist                            Printed name            Date / Time  Revised 8/2/2021                                                                      Page 2 of 2

## 2022-10-20 NOTE — PROGRESS NOTES
[facility] PRE-SURGICAL TESTING INSTRUCTIONS                      PRIOR TO PROCEDURE DATE:    1. PLEASE FOLLOW ANY INSTRUCTIONS GIVEN TO YOU PER YOUR SURGEON. 2. Arrange for someone to drive you home and be with you for the first 24 hours after discharge for your safety after your procedure for which you received sedation. Ensure it is someone we can share information with regarding your discharge. NOTE: At this time ONLY 2 ADULTS may accompany you & everyone must be MASKED. 3. You must contact your surgeon for instructions IF:  You are taking any blood thinners, aspirin, anti-inflammatory or vitamins. There is a change in your physical condition such as a cold, fever, rash, cuts, sores, or any other infection, especially near your surgical site. 4. Do not drink alcohol the day before or day of your procedure. Do not use any recreational marijuana at least 24 hours or street drugs (heroin, cocaine) at minimum 5 days prior to your procedure. 5. A Pre-Surgical History and Physical MUST be completed WITHIN 30 DAYS OR LESS prior to your procedure. by your Physician or an Urgent Care        THE DAY OF YOUR PROCEDURE:  1. Follow instructions for ARRIVAL TIME as DIRECTED BY YOUR SURGEON. 2. Enter the MAIN entrance from [address] and follow the signs to the free Parking [employer] or [employer] (offered free of charge 7 am-5pm). 3. Enter the Main Entrance of the hospital (do not enter from the lower level of the parking garage). Upon entrance, check in with the  at the surgical information desk on your LEFT. Bring your insurance card and photo ID to register      4. DO NOT EAT ANYTHING 8 hours prior to arrival for surgery. You may have up to 8 ounces of water 4 hours prior to your arrival for surgery.    NOTE: ALL Gastric, Bariatric & Bowel surgery patients - you MUST follow your surgeon's instructions regarding eating/ drinking as you will have very specific instructions to follow. If you did not receive these, call your surgeon's office immediately. 5. MEDICATIONS:  Take the following medications with a SMALL sip of water: bupropion, omeprazole, Pepcid, cetrizine, bring inhaler bring inhaler buspar   Use your usual dose of inhalers the morning of surgery. BRING your rescue inhaler with you to hospital.   Anesthesia does NOT want you to take insulin the morning of surgery. They will control your blood sugar while you are at the hospital. Please contact your ordering physician for instructions regarding your insulin the night before your procedure. If you have an insulin pump, please keep it set on basal rate. Bariatric patient's call your surgeon if on diabetic medications as some may need to be stopped 1 week prior to surgery    6. Do not swallow additional water when brushing teeth. No gum, candy, mints, or ice chips. Refrain from smoking or at least decrease the amount on day of surgery. 7. Morning of surgery:   Take a shower with an antibacterial soap (i.e., Safeguard or Dial) OR your physician may have instructed you to use Hibiclens. Dress in loose, comfortable clothing appropriate for redressing after your procedure. Do not wear jewelry (including body piercings), make-up (especially NO eye make-up), fingernail polish (NO toenail polish if foot/leg surgery), lotion, powders, or metal hairclips. Do not shave or wax for 72 hours prior to procedure near your operative site. Shaving with a razor can irritate your skin and make it easier to develop an infection. On the day of your procedure, any hair that needs to be removed near the surgical site will be 'clipped' by a healthcare worker using a special clipper designed to avoid skin irritation. 8. Dentures, glasses, or contacts will need to be removed before your procedure. Bring cases for your glasses, contacts, dentures, or hearing aids to protect them while you are in surgery.       9. If you use a CPAP, please bring it with you on the day of your procedure. 10. We recommend that valuable personal belongings such as cash, cell phones, e-tablets, or jewelry, be left at home during your stay. The hospital will not be responsible for valuables that are not secured in the hospital safe. However, if your insurance requires a co-pay, you may want to bring a method of payment, i.e., Check or credit card, if you wish to pay your co-pay the day of surgery. 11. If you are to stay overnight, you may bring a bag with personal items. Please have any large items you may need brought in by your family after your arrival to your hospital room. 12. If you have a Living Will or Durable Power of , please bring a copy on the day of your procedure. How we keep you safe and work to prevent surgical site infections:   1. Health care workers should always check your ID bracelet to verify your name and birth date. You will be asked many times to state your name, date of birth, and allergies. 2. Health care workers should always clean their hands with soap or alcohol gel before providing care to you. It is okay to ask anyone if they cleaned their hands before they touch you. 3. You will be actively involved in verifying the type of procedure you are having and ensuring the correct surgical site. This will be confirmed multiple times prior to your procedure. Do NOT maya your surgery site UNLESS instructed to by your surgeon. 4. When you are in the operating room, your surgical site will be cleansed with a special soap, and in most cases, you will be given an antibiotic before the surgery begins. What to expect AFTER your procedure? 1. Immediately following your procedure, your will be taken to the PACU for the first phase of your recovery. Your nurse will help you recover from any potential side effects of anesthesia, such as extreme drowsiness, changes in your vital signs or breathing patterns. Nausea, headache, muscle aches, or sore throat may also occur after anesthesia. Your nurse will help you manage these potential side effects. 2. For comfort and safety, arrange to have someone at home with you for the first 24 hours after discharge. 3. You and your family will be given written instructions about your diet, activity, dressing care, medications, and return visits. 4. Once at home, should issues with nausea, pain, or bleeding occur, or should you notice any signs of infection, you should call your surgeon. 5. Always clean your hands before and after caring for your wound. Do not let your family touch your surgery site without cleaning their hands. 6. Narcotic pain medications can cause significant constipation. You may want to add a stool softener to your postoperative medication schedule or speak to your surgeon on how best to manage this SIDE EFFECT. SPECIAL INSTRUCTIONS bring inhaler, patient acknowledges understanding of pre op instructions. Thank you for allowing us to care for you. We strive to exceed your expectations in the delivery of care and service provided to you and your family. If you need to contact the [facility] staff for any reason, please call us at [phone]    Instructions reviewed with patient during preadmission testing phone interview.   Marquita Simpson RN.10/20/2022 .3:06 PM      ADDITIONAL EDUCATIONAL INFORMATION REVIEWED PER PHONE WITH YOU AND/OR YOUR FAMILY:  Yes Hibiclens® Bathing Instructions   No Antibacterial Soap

## 2022-10-21 ENCOUNTER — ANESTHESIA EVENT (OUTPATIENT)
Dept: OPERATING ROOM | Age: 57
DRG: 354 | End: 2022-10-21
Payer: COMMERCIAL

## 2022-10-21 NOTE — PROGRESS NOTES
Place patient label inside box (if no patient label, complete below)  Name:  :  MR#:   Brent Stewart / PROCEDURE  I (we), Rafael Augustine (Patient Name) authorize Davina Valle DO, Neilendu Kundu, MD (Provider / Kerri Morris) and/or such assistants as may be selected by him/her, to perform the following operation/procedure(s): OPEN, RECURRENT, INCARCERATED, INCISIONAL HERNIA REPAIR WITH BILATERAL POSTERIOR COMPONENT SEPARATION, POSSIBLE BILATERAL, TRANSVERSUS ABDOMNUS RELEASE       Note: If unable to obtain consent prior to an emergent procedure, document the emergent reason in the medical record. This procedure has been explained to my (our) satisfaction and included in the explanation was: The intended benefit, nature, and extent of the procedure to be performed; The significant risks involved and the probability of success; Alternative procedures and methods of treatment; The dangers and probable consequences of such alternatives (including no procedure or treatment); The expected consequences of the procedure on my future health; Whether other qualified individuals would be performing important surgical tasks and/or whether  would be present to advise or support the procedure. I (we) understand that there are other risks of infection and other serious complications in the pre-operative/procedural and postoperative/procedural stages of my (our) care. I (we) have asked all of the questions which I (we) thought were important in deciding whether or not to undergo treatment or diagnosis. These questions have been answered to my (our) satisfaction. I (we) understand that no assurance can be given that the procedure will be a success, and no guarantee or warranty of success has been given to me (us).     It has been explained to me (us) that during the course of the operation/procedure, unforeseen conditions may be revealed that necessitate extension of the original procedure(s) or different procedure(s) than those set forth in Paragraph 1. I (we) authorize and request that the above-named physician, his/her assistants or his/her designees, perform procedures as necessary and desirable if deemed to be in my (our) best interest.     Revised 8/2/2021                                                                          Page 1 of 2             I acknowledge that health care personnel may be observing this procedure for the purpose of medical education or other specified purposes as may be necessary as requested and/or approved by my (our) physician. I (we) consent to the disposal by the hospital Pathologist of the removed tissue, parts or organs in accordance with hospital policy. I do ____ do not ____ consent to the use of a local infiltration pain blocking agent that will be used by my provider/surgical provider to help alleviate pain during my procedure. I do ____ do not ____ consent to an emergent blood transfusion in the case of a life-threatening situation that requires blood components to be administered. This consent is valid for 24 hours from the beginning of the procedure. This patient does ____ or does not ____ currently have a DNR status/order. If DNR order is in place, obtain Addendum to the Surgical Consent for ALL Patients with a DNR Order to address kelton-operative status for limited intervention or DNR suspension.      I have read and fully understand the above Consent for Operation/Procedure and that all blanks were completed before I signed the consent.   _____________________________       _____________________      ____/____am/pm  Signature of Patient or legal representative      Printed Name / Relationship            Date / Time   ____________________________       _____________________      ____/____am/pm  Witness to Signature                                    Printed Name                    Date / Time    If patient is unable to sign or is a minor, complete the following)  Patient is a minor, ____ years of age, or unable to sign because:   ______________________________________________________________________________________________    If a phone consent is obtained, consent will be documented by using two health care professionals, each affirming that the consenting party has no questions and gives consent for the procedure discussed with the physician/provider.   _____________________          ____________________       _____/_____am/pm   2nd witness to phone consent        Printed name           Date / Time    Informed Consent:  I have provided the explanation described above in section 1 to the patient and/or legal representative.  I have provided the patient and/or legal representative with an opportunity to ask any questions about the proposed operation/procedure.   ___________________________          ____________________         ____/____am/pm  Provider / Proceduralist                            Printed name            Date / Time  Revised 8/2/2021                                                                      Page 2 of 2

## 2022-10-21 NOTE — PROGRESS NOTES
Place patient label inside box (if no patient label, complete below)  Name:  :  MR#:   Kiran Narrow / PROCEDURE  I (we), Thi Caballero (Patient Name) authorize Magno Whipple (Provider / Claudeen Meter) and/or such assistants as may be selected by him/her, to perform the following operation/procedure(s): PANNICULECTOMY, POSSIBLE MAHOGANY DE LIS ABDOMINOPLASTY       Note: If unable to obtain consent prior to an emergent procedure, document the emergent reason in the medical record. This procedure has been explained to my (our) satisfaction and included in the explanation was: The intended benefit, nature, and extent of the procedure to be performed; The significant risks involved and the probability of success; Alternative procedures and methods of treatment; The dangers and probable consequences of such alternatives (including no procedure or treatment); The expected consequences of the procedure on my future health; Whether other qualified individuals would be performing important surgical tasks and/or whether  would be present to advise or support the procedure. I (we) understand that there are other risks of infection and other serious complications in the pre-operative/procedural and postoperative/procedural stages of my (our) care. I (we) have asked all of the questions which I (we) thought were important in deciding whether or not to undergo treatment or diagnosis. These questions have been answered to my (our) satisfaction. I (we) understand that no assurance can be given that the procedure will be a success, and no guarantee or warranty of success has been given to me (us). It has been explained to me (us) that during the course of the operation/procedure, unforeseen conditions may be revealed that necessitate extension of the original procedure(s) or different procedure(s) than those set forth in Paragraph 1.  I (we) authorize and request that the above-named physician, his/her assistants or his/her designees, perform procedures as necessary and desirable if deemed to be in my (our) best interest.     Revised 8/2/2021                                                                          Page 1 of 2             I acknowledge that health care personnel may be observing this procedure for the purpose of medical education or other specified purposes as may be necessary as requested and/or approved by my (our) physician. I (we) consent to the disposal by the hospital Pathologist of the removed tissue, parts or organs in accordance with hospital policy. I do ____ do not ____ consent to the use of a local infiltration pain blocking agent that will be used by my provider/surgical provider to help alleviate pain during my procedure. I do ____ do not ____ consent to an emergent blood transfusion in the case of a life-threatening situation that requires blood components to be administered. This consent is valid for 24 hours from the beginning of the procedure. This patient does ____ or does not ____ currently have a DNR status/order. If DNR order is in place, obtain Addendum to the Surgical Consent for ALL Patients with a DNR Order to address kelton-operative status for limited intervention or DNR suspension.      I have read and fully understand the above Consent for Operation/Procedure and that all blanks were completed before I signed the consent.   _____________________________       _____________________      ____/____am/pm  Signature of Patient or legal representative      Printed Name / Relationship            Date / Time   ____________________________       _____________________      ____/____am/pm  Witness to Signature                                    Printed Name                    Date / Time    If patient is unable to sign or is a minor, complete the following)  Patient is a minor, ____ years of age, or unable to sign because:   ______________________________________________________________________________________________    If a phone consent is obtained, consent will be documented by using two health care professionals, each affirming that the consenting party has no questions and gives consent for the procedure discussed with the physician/provider.   _____________________          ____________________       _____/_____am/pm   2nd witness to phone consent        Printed name           Date / Time    Informed Consent:  I have provided the explanation described above in section 1 to the patient and/or legal representative.  I have provided the patient and/or legal representative with an opportunity to ask any questions about the proposed operation/procedure.   ___________________________          ____________________         ____/____am/pm  Provider / Proceduralist                            Printed name            Date / Time  Revised 8/2/2021                                                                      Page 2 of 2

## 2022-10-24 ENCOUNTER — ANESTHESIA (OUTPATIENT)
Dept: OPERATING ROOM | Age: 57
DRG: 354 | End: 2022-10-24
Payer: COMMERCIAL

## 2022-10-24 ENCOUNTER — HOSPITAL ENCOUNTER (INPATIENT)
Age: 57
LOS: 4 days | Discharge: HOME | DRG: 354 | End: 2022-10-28
Attending: SURGERY | Admitting: SURGERY
Payer: COMMERCIAL

## 2022-10-24 DIAGNOSIS — M79.3 PANNICULITIS: ICD-10-CM

## 2022-10-24 DIAGNOSIS — G89.18 ACUTE POSTOPERATIVE PAIN OF ABDOMEN: Primary | ICD-10-CM

## 2022-10-24 DIAGNOSIS — K43.2 RECURRENT INCISIONAL HERNIA: ICD-10-CM

## 2022-10-24 DIAGNOSIS — R10.9 ACUTE POSTOPERATIVE PAIN OF ABDOMEN: Primary | ICD-10-CM

## 2022-10-24 PROBLEM — K45.8 INTERNAL HERNIA: Status: ACTIVE | Noted: 2022-10-24

## 2022-10-24 LAB
ABO/RH: NORMAL
ANTIBODY SCREEN: NORMAL

## 2022-10-24 PROCEDURE — 49566 PR REPAIR RECURR INCIS HERNIA,STRANG: CPT | Performed by: SURGERY

## 2022-10-24 PROCEDURE — 6370000000 HC RX 637 (ALT 250 FOR IP): Performed by: NURSE ANESTHETIST, CERTIFIED REGISTERED

## 2022-10-24 PROCEDURE — 1200000000 HC SEMI PRIVATE

## 2022-10-24 PROCEDURE — 6360000002 HC RX W HCPCS: Performed by: SURGERY

## 2022-10-24 PROCEDURE — 6360000002 HC RX W HCPCS: Performed by: ANESTHESIOLOGY

## 2022-10-24 PROCEDURE — 0JB80ZZ EXCISION OF ABDOMEN SUBCUTANEOUS TISSUE AND FASCIA, OPEN APPROACH: ICD-10-PCS | Performed by: SURGERY

## 2022-10-24 PROCEDURE — 2580000003 HC RX 258: Performed by: ANESTHESIOLOGY

## 2022-10-24 PROCEDURE — 2580000003 HC RX 258: Performed by: SURGERY

## 2022-10-24 PROCEDURE — 0WUF0JZ SUPPLEMENT ABDOMINAL WALL WITH SYNTHETIC SUBSTITUTE, OPEN APPROACH: ICD-10-PCS | Performed by: SURGERY

## 2022-10-24 PROCEDURE — 0WPF0JZ REMOVAL OF SYNTHETIC SUBSTITUTE FROM ABDOMINAL WALL, OPEN APPROACH: ICD-10-PCS | Performed by: SURGERY

## 2022-10-24 PROCEDURE — MISCABD ABDOMINOPLAST-RESECTION, PLCTN, REPSTN, LIPPO: Performed by: SURGERY

## 2022-10-24 PROCEDURE — 15734 MUSCLE-SKIN GRAFT TRUNK: CPT | Performed by: SURGERY

## 2022-10-24 PROCEDURE — 2500000003 HC RX 250 WO HCPCS: Performed by: NURSE ANESTHETIST, CERTIFIED REGISTERED

## 2022-10-24 PROCEDURE — C1781 MESH (IMPLANTABLE): HCPCS | Performed by: SURGERY

## 2022-10-24 PROCEDURE — 94761 N-INVAS EAR/PLS OXIMETRY MLT: CPT

## 2022-10-24 PROCEDURE — 6370000000 HC RX 637 (ALT 250 FOR IP): Performed by: SURGERY

## 2022-10-24 PROCEDURE — 2720000010 HC SURG SUPPLY STERILE: Performed by: SURGERY

## 2022-10-24 PROCEDURE — 3700000000 HC ANESTHESIA ATTENDED CARE: Performed by: SURGERY

## 2022-10-24 PROCEDURE — A4217 STERILE WATER/SALINE, 500 ML: HCPCS | Performed by: SURGERY

## 2022-10-24 PROCEDURE — 0KNL0ZZ RELEASE LEFT ABDOMEN MUSCLE, OPEN APPROACH: ICD-10-PCS | Performed by: SURGERY

## 2022-10-24 PROCEDURE — 6360000002 HC RX W HCPCS: Performed by: NURSE ANESTHETIST, CERTIFIED REGISTERED

## 2022-10-24 PROCEDURE — 2709999900 HC NON-CHARGEABLE SUPPLY: Performed by: SURGERY

## 2022-10-24 PROCEDURE — 88304 TISSUE EXAM BY PATHOLOGIST: CPT

## 2022-10-24 PROCEDURE — C1729 CATH, DRAINAGE: HCPCS | Performed by: SURGERY

## 2022-10-24 PROCEDURE — 49568 PR IMPLANT MESH HERNIA REPAIR/DEBRIDEMENT CLOSURE: CPT | Performed by: SURGERY

## 2022-10-24 PROCEDURE — 86850 RBC ANTIBODY SCREEN: CPT

## 2022-10-24 PROCEDURE — 86900 BLOOD TYPING SEROLOGIC ABO: CPT

## 2022-10-24 PROCEDURE — 0KNK0ZZ RELEASE RIGHT ABDOMEN MUSCLE, OPEN APPROACH: ICD-10-PCS | Performed by: SURGERY

## 2022-10-24 PROCEDURE — 3600000014 HC SURGERY LEVEL 4 ADDTL 15MIN: Performed by: SURGERY

## 2022-10-24 PROCEDURE — 94640 AIRWAY INHALATION TREATMENT: CPT

## 2022-10-24 PROCEDURE — 7100000000 HC PACU RECOVERY - FIRST 15 MIN: Performed by: SURGERY

## 2022-10-24 PROCEDURE — 2500000003 HC RX 250 WO HCPCS: Performed by: SURGERY

## 2022-10-24 PROCEDURE — 2700000000 HC OXYGEN THERAPY PER DAY

## 2022-10-24 PROCEDURE — 3700000001 HC ADD 15 MINUTES (ANESTHESIA): Performed by: SURGERY

## 2022-10-24 PROCEDURE — 86901 BLOOD TYPING SEROLOGIC RH(D): CPT

## 2022-10-24 PROCEDURE — 0DQV0ZZ REPAIR MESENTERY, OPEN APPROACH: ICD-10-PCS | Performed by: SURGERY

## 2022-10-24 PROCEDURE — 6360000002 HC RX W HCPCS: Performed by: STUDENT IN AN ORGANIZED HEALTH CARE EDUCATION/TRAINING PROGRAM

## 2022-10-24 PROCEDURE — 44050 REDUCE BOWEL OBSTRUCTION: CPT | Performed by: SURGERY

## 2022-10-24 PROCEDURE — 3600000004 HC SURGERY LEVEL 4 BASE: Performed by: SURGERY

## 2022-10-24 PROCEDURE — 7100000001 HC PACU RECOVERY - ADDTL 15 MIN: Performed by: SURGERY

## 2022-10-24 PROCEDURE — 2580000003 HC RX 258: Performed by: NURSE ANESTHETIST, CERTIFIED REGISTERED

## 2022-10-24 DEVICE — MESH HERN 12X12IN POLY 4 HYDROXYBUTYRATE SYN SQ WVN: Type: IMPLANTABLE DEVICE | Status: FUNCTIONAL

## 2022-10-24 RX ORDER — MAGNESIUM HYDROXIDE 1200 MG/15ML
LIQUID ORAL CONTINUOUS PRN
Status: DISCONTINUED | OUTPATIENT
Start: 2022-10-24 | End: 2022-10-24 | Stop reason: HOSPADM

## 2022-10-24 RX ORDER — FENTANYL CITRATE 50 UG/ML
INJECTION, SOLUTION INTRAMUSCULAR; INTRAVENOUS PRN
Status: DISCONTINUED | OUTPATIENT
Start: 2022-10-24 | End: 2022-10-24 | Stop reason: SDUPTHER

## 2022-10-24 RX ORDER — HEPARIN SODIUM 5000 [USP'U]/ML
5000 INJECTION, SOLUTION INTRAVENOUS; SUBCUTANEOUS EVERY 8 HOURS SCHEDULED
Status: DISCONTINUED | OUTPATIENT
Start: 2022-10-25 | End: 2022-10-28 | Stop reason: HOSPADM

## 2022-10-24 RX ORDER — SODIUM CHLORIDE 9 MG/ML
INJECTION, SOLUTION INTRAVENOUS PRN
Status: DISCONTINUED | OUTPATIENT
Start: 2022-10-24 | End: 2022-10-28 | Stop reason: HOSPADM

## 2022-10-24 RX ORDER — ONDANSETRON 2 MG/ML
INJECTION INTRAMUSCULAR; INTRAVENOUS PRN
Status: DISCONTINUED | OUTPATIENT
Start: 2022-10-24 | End: 2022-10-24 | Stop reason: SDUPTHER

## 2022-10-24 RX ORDER — POLYETHYLENE GLYCOL 3350 17 G/17G
17 POWDER, FOR SOLUTION ORAL DAILY PRN
Status: DISCONTINUED | OUTPATIENT
Start: 2022-10-24 | End: 2022-10-25

## 2022-10-24 RX ORDER — FAMOTIDINE 20 MG/1
20 TABLET, FILM COATED ORAL 2 TIMES DAILY
Status: DISCONTINUED | OUTPATIENT
Start: 2022-10-24 | End: 2022-10-25

## 2022-10-24 RX ORDER — SODIUM CHLORIDE 0.9 % (FLUSH) 0.9 %
5-40 SYRINGE (ML) INJECTION EVERY 12 HOURS SCHEDULED
Status: DISCONTINUED | OUTPATIENT
Start: 2022-10-24 | End: 2022-10-24 | Stop reason: HOSPADM

## 2022-10-24 RX ORDER — OXYCODONE HYDROCHLORIDE 5 MG/1
5 TABLET ORAL EVERY 4 HOURS PRN
Status: DISCONTINUED | OUTPATIENT
Start: 2022-10-24 | End: 2022-10-28 | Stop reason: HOSPADM

## 2022-10-24 RX ORDER — ACETAMINOPHEN 325 MG/1
650 TABLET ORAL EVERY 6 HOURS
Status: DISCONTINUED | OUTPATIENT
Start: 2022-10-24 | End: 2022-10-28 | Stop reason: HOSPADM

## 2022-10-24 RX ORDER — CYCLOBENZAPRINE HCL 10 MG
5 TABLET ORAL 3 TIMES DAILY PRN
Status: DISCONTINUED | OUTPATIENT
Start: 2022-10-24 | End: 2022-10-27

## 2022-10-24 RX ORDER — SODIUM CHLORIDE 0.9 % (FLUSH) 0.9 %
10 SYRINGE (ML) INJECTION PRN
Status: DISCONTINUED | OUTPATIENT
Start: 2022-10-24 | End: 2022-10-28 | Stop reason: HOSPADM

## 2022-10-24 RX ORDER — ALBUTEROL SULFATE 90 UG/1
AEROSOL, METERED RESPIRATORY (INHALATION) PRN
Status: DISCONTINUED | OUTPATIENT
Start: 2022-10-24 | End: 2022-10-24 | Stop reason: SDUPTHER

## 2022-10-24 RX ORDER — BUPIVACAINE HYDROCHLORIDE 5 MG/ML
INJECTION, SOLUTION EPIDURAL; INTRACAUDAL
Status: DISCONTINUED
Start: 2022-10-24 | End: 2022-10-24

## 2022-10-24 RX ORDER — LIDOCAINE HYDROCHLORIDE 20 MG/ML
INJECTION, SOLUTION INTRAVENOUS PRN
Status: DISCONTINUED | OUTPATIENT
Start: 2022-10-24 | End: 2022-10-24 | Stop reason: SDUPTHER

## 2022-10-24 RX ORDER — ONDANSETRON 4 MG/1
4 TABLET, ORALLY DISINTEGRATING ORAL EVERY 8 HOURS PRN
Status: DISCONTINUED | OUTPATIENT
Start: 2022-10-24 | End: 2022-10-28 | Stop reason: HOSPADM

## 2022-10-24 RX ORDER — PROCHLORPERAZINE EDISYLATE 5 MG/ML
5 INJECTION INTRAMUSCULAR; INTRAVENOUS
Status: COMPLETED | OUTPATIENT
Start: 2022-10-24 | End: 2022-10-24

## 2022-10-24 RX ORDER — SODIUM CHLORIDE, SODIUM LACTATE, POTASSIUM CHLORIDE, CALCIUM CHLORIDE 600; 310; 30; 20 MG/100ML; MG/100ML; MG/100ML; MG/100ML
INJECTION, SOLUTION INTRAVENOUS CONTINUOUS PRN
Status: DISCONTINUED | OUTPATIENT
Start: 2022-10-24 | End: 2022-10-24 | Stop reason: SDUPTHER

## 2022-10-24 RX ORDER — HEPARIN SODIUM 5000 [USP'U]/ML
5000 INJECTION, SOLUTION INTRAVENOUS; SUBCUTANEOUS ONCE
Status: COMPLETED | OUTPATIENT
Start: 2022-10-24 | End: 2022-10-24

## 2022-10-24 RX ORDER — MEPERIDINE HYDROCHLORIDE 25 MG/ML
12.5 INJECTION INTRAMUSCULAR; INTRAVENOUS; SUBCUTANEOUS EVERY 5 MIN PRN
Status: DISCONTINUED | OUTPATIENT
Start: 2022-10-24 | End: 2022-10-24 | Stop reason: HOSPADM

## 2022-10-24 RX ORDER — PROPOFOL 10 MG/ML
INJECTION, EMULSION INTRAVENOUS PRN
Status: DISCONTINUED | OUTPATIENT
Start: 2022-10-24 | End: 2022-10-24 | Stop reason: SDUPTHER

## 2022-10-24 RX ORDER — SODIUM CHLORIDE, SODIUM LACTATE, POTASSIUM CHLORIDE, CALCIUM CHLORIDE 600; 310; 30; 20 MG/100ML; MG/100ML; MG/100ML; MG/100ML
INJECTION, SOLUTION INTRAVENOUS CONTINUOUS
Status: DISCONTINUED | OUTPATIENT
Start: 2022-10-24 | End: 2022-10-25

## 2022-10-24 RX ORDER — SODIUM CHLORIDE, SODIUM LACTATE, POTASSIUM CHLORIDE, CALCIUM CHLORIDE 600; 310; 30; 20 MG/100ML; MG/100ML; MG/100ML; MG/100ML
INJECTION, SOLUTION INTRAVENOUS CONTINUOUS
Status: DISCONTINUED | OUTPATIENT
Start: 2022-10-24 | End: 2022-10-24 | Stop reason: HOSPADM

## 2022-10-24 RX ORDER — BUSPIRONE HYDROCHLORIDE 10 MG/1
15 TABLET ORAL 3 TIMES DAILY
Status: DISCONTINUED | OUTPATIENT
Start: 2022-10-24 | End: 2022-10-28 | Stop reason: HOSPADM

## 2022-10-24 RX ORDER — OXYCODONE HYDROCHLORIDE 5 MG/1
10 TABLET ORAL EVERY 4 HOURS PRN
Status: DISCONTINUED | OUTPATIENT
Start: 2022-10-24 | End: 2022-10-28 | Stop reason: HOSPADM

## 2022-10-24 RX ORDER — SODIUM CHLORIDE, SODIUM LACTATE, POTASSIUM CHLORIDE, CALCIUM CHLORIDE 600; 310; 30; 20 MG/100ML; MG/100ML; MG/100ML; MG/100ML
INJECTION, SOLUTION INTRAVENOUS CONTINUOUS
Status: DISCONTINUED | OUTPATIENT
Start: 2022-10-24 | End: 2022-10-24

## 2022-10-24 RX ORDER — GENTAMICIN SULFATE 40 MG/ML
INJECTION, SOLUTION INTRAMUSCULAR; INTRAVENOUS PRN
Status: DISCONTINUED | OUTPATIENT
Start: 2022-10-24 | End: 2022-10-24 | Stop reason: HOSPADM

## 2022-10-24 RX ORDER — SODIUM CHLORIDE 9 MG/ML
25 INJECTION, SOLUTION INTRAVENOUS PRN
Status: DISCONTINUED | OUTPATIENT
Start: 2022-10-24 | End: 2022-10-24 | Stop reason: HOSPADM

## 2022-10-24 RX ORDER — SODIUM CHLORIDE 0.9 % (FLUSH) 0.9 %
10 SYRINGE (ML) INJECTION EVERY 12 HOURS SCHEDULED
Status: DISCONTINUED | OUTPATIENT
Start: 2022-10-24 | End: 2022-10-28 | Stop reason: HOSPADM

## 2022-10-24 RX ORDER — NALOXONE HYDROCHLORIDE 0.4 MG/ML
INJECTION, SOLUTION INTRAMUSCULAR; INTRAVENOUS; SUBCUTANEOUS PRN
Status: DISCONTINUED | OUTPATIENT
Start: 2022-10-24 | End: 2022-10-25

## 2022-10-24 RX ORDER — ALBUTEROL SULFATE 2.5 MG/3ML
2.5 SOLUTION RESPIRATORY (INHALATION) ONCE
Status: COMPLETED | OUTPATIENT
Start: 2022-10-24 | End: 2022-10-24

## 2022-10-24 RX ORDER — ALBUTEROL SULFATE 2.5 MG/3ML
2.5 SOLUTION RESPIRATORY (INHALATION) 4 TIMES DAILY PRN
Status: DISCONTINUED | OUTPATIENT
Start: 2022-10-24 | End: 2022-10-24 | Stop reason: SDUPTHER

## 2022-10-24 RX ORDER — FIBRINOGEN HUMAN AND THROMBIN HUMAN 1 ML
KIT TOPICAL PRN
Status: DISCONTINUED | OUTPATIENT
Start: 2022-10-24 | End: 2022-10-24 | Stop reason: HOSPADM

## 2022-10-24 RX ORDER — HYDRALAZINE HYDROCHLORIDE 20 MG/ML
10 INJECTION INTRAMUSCULAR; INTRAVENOUS
Status: DISCONTINUED | OUTPATIENT
Start: 2022-10-24 | End: 2022-10-24 | Stop reason: HOSPADM

## 2022-10-24 RX ORDER — SODIUM CHLORIDE 0.9 % (FLUSH) 0.9 %
5-40 SYRINGE (ML) INJECTION PRN
Status: DISCONTINUED | OUTPATIENT
Start: 2022-10-24 | End: 2022-10-24 | Stop reason: HOSPADM

## 2022-10-24 RX ORDER — ONDANSETRON 2 MG/ML
4 INJECTION INTRAMUSCULAR; INTRAVENOUS EVERY 6 HOURS PRN
Status: DISCONTINUED | OUTPATIENT
Start: 2022-10-24 | End: 2022-10-28 | Stop reason: HOSPADM

## 2022-10-24 RX ORDER — ROCURONIUM BROMIDE 10 MG/ML
INJECTION, SOLUTION INTRAVENOUS PRN
Status: DISCONTINUED | OUTPATIENT
Start: 2022-10-24 | End: 2022-10-24 | Stop reason: SDUPTHER

## 2022-10-24 RX ORDER — MIDAZOLAM HYDROCHLORIDE 1 MG/ML
INJECTION INTRAMUSCULAR; INTRAVENOUS PRN
Status: DISCONTINUED | OUTPATIENT
Start: 2022-10-24 | End: 2022-10-24 | Stop reason: SDUPTHER

## 2022-10-24 RX ORDER — ALBUTEROL SULFATE 2.5 MG/3ML
2.5 SOLUTION RESPIRATORY (INHALATION) 2 TIMES DAILY
Status: DISCONTINUED | OUTPATIENT
Start: 2022-10-24 | End: 2022-10-28 | Stop reason: HOSPADM

## 2022-10-24 RX ORDER — MORPHINE SULFATE 10 MG/ML
INJECTION, SOLUTION INTRAMUSCULAR; INTRAVENOUS PRN
Status: DISCONTINUED | OUTPATIENT
Start: 2022-10-24 | End: 2022-10-24 | Stop reason: SDUPTHER

## 2022-10-24 RX ORDER — GLYCOPYRROLATE 1 MG/5 ML
SYRINGE (ML) INTRAVENOUS PRN
Status: DISCONTINUED | OUTPATIENT
Start: 2022-10-24 | End: 2022-10-24 | Stop reason: SDUPTHER

## 2022-10-24 RX ORDER — BUPROPION HYDROCHLORIDE 150 MG/1
150 TABLET ORAL DAILY
Status: DISCONTINUED | OUTPATIENT
Start: 2022-10-24 | End: 2022-10-28 | Stop reason: HOSPADM

## 2022-10-24 RX ORDER — METHOCARBAMOL 500 MG/1
1000 TABLET, FILM COATED ORAL 4 TIMES DAILY
Status: DISCONTINUED | OUTPATIENT
Start: 2022-10-24 | End: 2022-10-27

## 2022-10-24 RX ORDER — LEVALBUTEROL INHALATION SOLUTION 0.63 MG/3ML
0.63 SOLUTION RESPIRATORY (INHALATION) EVERY 4 HOURS PRN
Status: DISCONTINUED | OUTPATIENT
Start: 2022-10-24 | End: 2022-10-28 | Stop reason: HOSPADM

## 2022-10-24 RX ADMIN — MIDAZOLAM HYDROCHLORIDE 1 MG: 2 INJECTION, SOLUTION INTRAMUSCULAR; INTRAVENOUS at 07:30

## 2022-10-24 RX ADMIN — CEFAZOLIN 2000 MG: 2 INJECTION, POWDER, FOR SOLUTION INTRAMUSCULAR; INTRAVENOUS at 11:24

## 2022-10-24 RX ADMIN — FAMOTIDINE 20 MG: 20 TABLET ORAL at 20:30

## 2022-10-24 RX ADMIN — HYDROMORPHONE HYDROCHLORIDE 0.5 MG: 1 INJECTION, SOLUTION INTRAMUSCULAR; INTRAVENOUS; SUBCUTANEOUS at 13:26

## 2022-10-24 RX ADMIN — CEFAZOLIN 2000 MG: 2 INJECTION, POWDER, FOR SOLUTION INTRAMUSCULAR; INTRAVENOUS at 19:56

## 2022-10-24 RX ADMIN — LIDOCAINE HYDROCHLORIDE 100 MG: 20 INJECTION, SOLUTION INTRAVENOUS at 07:37

## 2022-10-24 RX ADMIN — FENTANYL CITRATE 50 MCG: 50 INJECTION, SOLUTION INTRAMUSCULAR; INTRAVENOUS at 07:31

## 2022-10-24 RX ADMIN — PROCHLORPERAZINE EDISYLATE 5 MG: 5 INJECTION, SOLUTION INTRAMUSCULAR; INTRAVENOUS at 13:25

## 2022-10-24 RX ADMIN — ALBUTEROL SULFATE 2.5 MG: 2.5 SOLUTION RESPIRATORY (INHALATION) at 16:25

## 2022-10-24 RX ADMIN — SUGAMMADEX 150 MG: 100 INJECTION, SOLUTION INTRAVENOUS at 12:27

## 2022-10-24 RX ADMIN — MORPHINE SULFATE 2 MG: 10 INJECTION, SOLUTION INTRAMUSCULAR; INTRAVENOUS at 08:33

## 2022-10-24 RX ADMIN — SODIUM CHLORIDE, POTASSIUM CHLORIDE, SODIUM LACTATE AND CALCIUM CHLORIDE: 600; 310; 30; 20 INJECTION, SOLUTION INTRAVENOUS at 07:00

## 2022-10-24 RX ADMIN — SODIUM CHLORIDE, POTASSIUM CHLORIDE, SODIUM LACTATE AND CALCIUM CHLORIDE: 600; 310; 30; 20 INJECTION, SOLUTION INTRAVENOUS at 06:00

## 2022-10-24 RX ADMIN — METHOCARBAMOL 1000 MG: 100 INJECTION, SOLUTION INTRAMUSCULAR; INTRAVENOUS at 16:26

## 2022-10-24 RX ADMIN — MORPHINE SULFATE 2 MG: 10 INJECTION, SOLUTION INTRAMUSCULAR; INTRAVENOUS at 08:14

## 2022-10-24 RX ADMIN — SODIUM CHLORIDE, PRESERVATIVE FREE 10 ML: 5 INJECTION INTRAVENOUS at 20:30

## 2022-10-24 RX ADMIN — ROCURONIUM BROMIDE 50 MG: 10 INJECTION INTRAVENOUS at 08:11

## 2022-10-24 RX ADMIN — TRANEXAMIC ACID 1000 MG: 1 INJECTION, SOLUTION INTRAVENOUS at 08:12

## 2022-10-24 RX ADMIN — FENTANYL CITRATE 50 MCG: 50 INJECTION, SOLUTION INTRAMUSCULAR; INTRAVENOUS at 07:29

## 2022-10-24 RX ADMIN — HYDROMORPHONE HYDROCHLORIDE 0.5 MG: 1 INJECTION, SOLUTION INTRAMUSCULAR; INTRAVENOUS; SUBCUTANEOUS at 13:47

## 2022-10-24 RX ADMIN — SODIUM CHLORIDE, POTASSIUM CHLORIDE, SODIUM LACTATE AND CALCIUM CHLORIDE: 600; 310; 30; 20 INJECTION, SOLUTION INTRAVENOUS at 10:01

## 2022-10-24 RX ADMIN — Medication: at 15:09

## 2022-10-24 RX ADMIN — CEFAZOLIN 2000 MG: 2 INJECTION, POWDER, FOR SOLUTION INTRAMUSCULAR; INTRAVENOUS at 07:29

## 2022-10-24 RX ADMIN — PROPOFOL 150 MG: 10 INJECTION, EMULSION INTRAVENOUS at 07:37

## 2022-10-24 RX ADMIN — BUSPIRONE HYDROCHLORIDE 15 MG: 10 TABLET ORAL at 20:30

## 2022-10-24 RX ADMIN — ROCURONIUM BROMIDE 50 MG: 10 INJECTION INTRAVENOUS at 07:37

## 2022-10-24 RX ADMIN — METHOCARBAMOL TABLETS 1000 MG: 500 TABLET, COATED ORAL at 20:30

## 2022-10-24 RX ADMIN — SODIUM CHLORIDE, POTASSIUM CHLORIDE, SODIUM LACTATE AND CALCIUM CHLORIDE: 600; 310; 30; 20 INJECTION, SOLUTION INTRAVENOUS at 11:32

## 2022-10-24 RX ADMIN — ONDANSETRON 4 MG: 2 INJECTION INTRAMUSCULAR; INTRAVENOUS at 12:18

## 2022-10-24 RX ADMIN — Medication 0.4 MG: at 11:32

## 2022-10-24 RX ADMIN — ROCURONIUM BROMIDE 30 MG: 10 INJECTION INTRAVENOUS at 09:25

## 2022-10-24 RX ADMIN — MIDAZOLAM HYDROCHLORIDE 1 MG: 2 INJECTION, SOLUTION INTRAMUSCULAR; INTRAVENOUS at 07:29

## 2022-10-24 RX ADMIN — ACETAMINOPHEN 650 MG: 325 TABLET ORAL at 20:30

## 2022-10-24 RX ADMIN — SODIUM CHLORIDE, SODIUM LACTATE, POTASSIUM CHLORIDE, AND CALCIUM CHLORIDE: .6; .31; .03; .02 INJECTION, SOLUTION INTRAVENOUS at 07:00

## 2022-10-24 RX ADMIN — SODIUM CHLORIDE, POTASSIUM CHLORIDE, SODIUM LACTATE AND CALCIUM CHLORIDE: 600; 310; 30; 20 INJECTION, SOLUTION INTRAVENOUS at 19:55

## 2022-10-24 RX ADMIN — SODIUM CHLORIDE, SODIUM LACTATE, POTASSIUM CHLORIDE, AND CALCIUM CHLORIDE: .6; .31; .03; .02 INJECTION, SOLUTION INTRAVENOUS at 12:05

## 2022-10-24 RX ADMIN — ALBUTEROL SULFATE 2 PUFF: 90 AEROSOL, METERED RESPIRATORY (INHALATION) at 12:28

## 2022-10-24 RX ADMIN — MORPHINE SULFATE 2 MG: 10 INJECTION, SOLUTION INTRAMUSCULAR; INTRAVENOUS at 09:24

## 2022-10-24 RX ADMIN — HEPARIN SODIUM 5000 UNITS: 5000 INJECTION INTRAVENOUS; SUBCUTANEOUS at 06:09

## 2022-10-24 RX ADMIN — MORPHINE SULFATE 2 MG: 10 INJECTION, SOLUTION INTRAMUSCULAR; INTRAVENOUS at 08:17

## 2022-10-24 RX ADMIN — ROCURONIUM BROMIDE 20 MG: 10 INJECTION INTRAVENOUS at 10:40

## 2022-10-24 RX ADMIN — ALBUTEROL SULFATE 2.5 MG: 2.5 SOLUTION RESPIRATORY (INHALATION) at 19:46

## 2022-10-24 RX ADMIN — BUPROPION HYDROCHLORIDE 150 MG: 150 TABLET, FILM COATED, EXTENDED RELEASE ORAL at 20:30

## 2022-10-24 RX ADMIN — MORPHINE SULFATE 2 MG: 10 INJECTION, SOLUTION INTRAMUSCULAR; INTRAVENOUS at 08:13

## 2022-10-24 RX ADMIN — HYDROMORPHONE HYDROCHLORIDE 0.5 MG: 1 INJECTION, SOLUTION INTRAMUSCULAR; INTRAVENOUS; SUBCUTANEOUS at 14:20

## 2022-10-24 RX ADMIN — HYDROMORPHONE HYDROCHLORIDE 0.5 MG: 1 INJECTION, SOLUTION INTRAMUSCULAR; INTRAVENOUS; SUBCUTANEOUS at 14:09

## 2022-10-24 RX ADMIN — SODIUM CHLORIDE, POTASSIUM CHLORIDE, SODIUM LACTATE AND CALCIUM CHLORIDE: 600; 310; 30; 20 INJECTION, SOLUTION INTRAVENOUS at 05:45

## 2022-10-24 ASSESSMENT — PAIN SCALES - GENERAL
PAINLEVEL_OUTOF10: 8
PAINLEVEL_OUTOF10: 9
PAINLEVEL_OUTOF10: 8
PAINLEVEL_OUTOF10: 8
PAINLEVEL_OUTOF10: 6
PAINLEVEL_OUTOF10: 9
PAINLEVEL_OUTOF10: 8
PAINLEVEL_OUTOF10: 9
PAINLEVEL_OUTOF10: 4
PAINLEVEL_OUTOF10: 8
PAINLEVEL_OUTOF10: 7
PAINLEVEL_OUTOF10: 7
PAINLEVEL_OUTOF10: 8

## 2022-10-24 ASSESSMENT — PAIN DESCRIPTION - PAIN TYPE
TYPE: ACUTE PAIN
TYPE: SURGICAL PAIN
TYPE: ACUTE PAIN
TYPE: SURGICAL PAIN

## 2022-10-24 ASSESSMENT — PAIN DESCRIPTION - DESCRIPTORS
DESCRIPTORS: ACHING
DESCRIPTORS: PRESSURE
DESCRIPTORS: PRESSURE
DESCRIPTORS: ACHING
DESCRIPTORS: PRESSURE
DESCRIPTORS: PRESSURE

## 2022-10-24 ASSESSMENT — PAIN - FUNCTIONAL ASSESSMENT
PAIN_FUNCTIONAL_ASSESSMENT: 0-10
PAIN_FUNCTIONAL_ASSESSMENT: PREVENTS OR INTERFERES SOME ACTIVE ACTIVITIES AND ADLS

## 2022-10-24 ASSESSMENT — PAIN DESCRIPTION - FREQUENCY
FREQUENCY: CONTINUOUS

## 2022-10-24 ASSESSMENT — PAIN DESCRIPTION - LOCATION
LOCATION: ABDOMEN

## 2022-10-24 ASSESSMENT — PAIN DESCRIPTION - ORIENTATION
ORIENTATION: RIGHT;LEFT;MID

## 2022-10-24 ASSESSMENT — PAIN DESCRIPTION - ONSET
ONSET: ON-GOING

## 2022-10-24 NOTE — PROGRESS NOTES
4 Eyes Admission Assessment     I agree as the admission nurse that 2 RN's have performed a thorough Head to Toe Skin Assessment on the patient. ALL assessment sites listed below have been assessed on admission. Areas assessed by both nurses: Karla/DJ  [x]   Head, Face, and Ears   [x]   Shoulders, Back, and Chest  [x]   Arms, Elbows, and Hands   [x]   Coccyx, Sacrum, and Ischium  [x]   Legs, Feet, and Heels        Does the Patient have Skin Breakdown?   No         Keyur Prevention initiated:  Yes   Wound Care Orders initiated:  No      [facility] nurse consulted for Pressure Injury (Stage 3,4, Unstageable, DTI, NWPT, and Complex wounds) or Keyur score 18 or lower:  No      Nurse 1 eSignature: Electronically signed by Raquel Uriostegui RN on 10/24/22 at 6:54 PM EDT    **SHARE this note so that the co-signing nurse is able to place an eSignature**    Nurse 2 eSignature: Electronically signed by Hector Ritchie RN on 10/24/22 at 6:56 PM EDT

## 2022-10-24 NOTE — PROGRESS NOTES
Pt remains nauseous and unable to tolerate much PO, including PO medications at this time. Pt reports pain is getting better but is still a 7/10 despite PCA pump. IVPB Robaxin requested from Dr Ricardo Sanders to help manage abdominal pain.

## 2022-10-24 NOTE — PROGRESS NOTES
Pt spontaneously started to have a difficult time breathing. Pt states it feels like she can not get enough air in and she is going to aspirate. Pt with h/o Asthma. One time order for Albuterol treatment ordered by Dr Kinjal Betancourt.  RT notified and stated they were en route

## 2022-10-24 NOTE — PROGRESS NOTES
Procedure(s):  OPEN, RECURRENT, INCARCERATED, INCISIONAL HERNIA REPAIR WITH BILATERAL POSTERIOR COMPONENT SEPARATION, REPAIR OF INTERNAL HERNIA WITH CLOSURE OF MESENTARY DEFECT, BILATERAL, TRANSVERSUS ABDOMNIS RELEASE  PANNICULECTOMY;  [id number] ABDOMINOPLASTY    Current Allergies: Singulair [montelukast]    No results for input(s): POCGLU in the last 72 hours. Admitted to PACU bed 8 from OR. Arrived on a hospital bed . Attached to PACU monitoring system. Alarms and parameters set. Report received from anesthesia personnel. OR staff did not report skin issues that were observed while in OR  Pt arrived with oxygen per nasal cannula with oxygen at 5 liters. Athrombic wraps in place.

## 2022-10-24 NOTE — H&P
Inhale 2 puffs into the lungs 2 times daily   Yes Historical Provider, MD   cariprazine hcl (VRAYLAR) 1.5 MG capsule Vraylar 1.5 mg capsule   Take 1 capsule every day by oral route in the morning for 30 days. Historical Provider, MD   QSYMIA 3.75-23 MG CP24 TAKE 1 CAPSULE BY MOUTH EVERY DAY FOR 14 DAYS 8/26/22   Historical Provider, MD   lisdexamfetamine (VYVANSE) 70 MG capsule Take 70 mg by mouth every morning. Historical Provider, MD   albuterol sulfate  (90 Base) MCG/ACT inhaler albuterol sulfate HFA 90 mcg/actuation aerosol inhaler   INHALE 2 PUFFS BY MOUTH EVERY 4 HOURS AS NEEDED FOR WHEEZE    Historical Provider, MD   Azelastine-Fluticasone 137-50 MCG/ACT SUSP azelastine-fluticasone 137 mcg-50 mcg/spray nasal spray    Historical Provider, MD   busPIRone (BUSPAR) 15 MG tablet 3 times daily 4/13/21   Historical Provider, MD   cetirizine (ZYRTEC) 10 MG tablet as needed    Historical Provider, MD   cyclobenzaprine (FLEXERIL) 10 MG tablet as needed 9/29/21   Historical Provider, MD   estradiol (ESTRACE) 1 MG tablet  10/19/21   Historical Provider, MD   folic acid (FOLVITE) 1 MG tablet  10/1/21   Historical Provider, MD   furosemide (LASIX) 20 MG tablet daily 10/1/21   Historical Provider, MD   hydrOXYzine (ATARAX) 50 MG tablet at bedtime 10/31/21   Historical Provider, MD   levalbuterol (XOPENEX) 0.63 MG/3ML nebulization 3 mLs as needed    Historical Provider, MD   midodrine (PROAMATINE) 5 MG tablet midodrine 5 mg tablet   TAKE 1 TABLETS (5 MG) BY ORAL ROUTE 3 TIMES PER DAY. IF SYSTOLIC BLOOD PRESSURE IS MORE THAN 130,HOLD.  IF SYSTOLIC BP IS LESS THAN 571 INCREASE THE DOSE TO 10MG PO TID 4/16/21   Historical Provider, MD   nystatin (MYCOSTATIN) 244365 UNIT/GM cream nystatin 100,000 unit/gram topical cream   APPLY TO AFFECTED AREA TWICE A DAY    Historical Provider, MD   omeprazole (PRILOSEC) 40 MG delayed release capsule omeprazole 40 mg capsule,delayed release   TAKE 1 CAPSULE BY MOUTH EVERY DAY PRN Historical Provider, MD   KLOR-CON M20 20 MEQ extended release tablet daily 8/26/21   Historical Provider, MD   progesterone (PROMETRIUM) 200 MG CAPS capsule  11/10/21   Historical Provider, MD   promethazine (PHENERGAN) 12.5 MG tablet promethazine 12.5 mg tablet   TAKE 1 TABLET EVERY 6 HOURS BY ORAL ROUTE AS NEEDED. 4/16/21   Historical Provider, MD   pseudoephedrine (SUDAFED 12 HR) 120 MG extended release tablet as needed    Historical Provider, MD   traZODone (DESYREL) 100 MG tablet trazodone 100 mg tablet   TAKE 1 TABLET BY MOUTH AT BEDTIME    Historical Provider, MD         Allergies:  Singulair [montelukast]    PHYSICAL EXAM:      BP (!) 143/79   Pulse 81   Temp 97.8 °F (36.6 °C) (Oral)   Resp 16   Ht 4' 11\" (1.499 m)   Wt 164 lb 6.4 oz (74.6 kg)   SpO2 97%   BMI 33.20 kg/m²      Airway:  Airway patent with no audible stridor    Heart:  Regular rate and rhythm, No murmur noted    Lungs:  No increased work of breathing, good air exchange, clear to auscultation bilaterally, no crackles or wheezing    Abdomen:  Soft, non-distended, non-tender, no rebound tenderness or guarding, and no masses palpated    ASSESSMENT AND PLAN     Patient is a 56 y.o. female with above specified procedure planned. 1.  The patients history and physical was obtained and signed off by the pre-admission testing department. Patient seen and focused exam done today- no new changes since last physical exam on 10/3/22    2. Access to ancillary services are available per request of the provider.     AVNI Gutierrez - CNP     10/24/2022

## 2022-10-24 NOTE — BRIEF OP NOTE
Brief Postoperative Note      Patient: Delilah Delgado  YOB: 1965  MRN: [id number]    Date of Procedure: 10/24/2022    Pre-Op Diagnosis: Recurrent incisional hernia [K43.2]  Panniculitis [M79.3]    Post-Op Diagnosis: Same       Procedure(s):  OPEN, RECURRENT, INCARCERATED, INCISIONAL HERNIA REPAIR WITH BILATERAL POSTERIOR COMPONENT SEPARATION, REPAIR OF INTERNAL HERNIA WITH CLOSURE OF MESENTARY DEFECT, BILATERAL, TRANSVERSUS ABDOMNIS RELEASE  PANNICULECTOMY;  [id number] ABDOMINOPLASTY    Surgeon(s):  MD Rajeev August DO Ruven Moots, DO Jessica Art, MD Jessica Art, MD    Assistant:  First Assistant: Lucy Gregory  Resident: Raji Hernandez MD    Anesthesia: General    Estimated Blood Loss (mL): 021     Complications: None    Specimens:   ID Type Source Tests Collected by Time Destination   A : HERNIA MESH Tissue Tissue SURGICAL PATHOLOGY Rajeev Zhou DO 10/24/2022 1021    B : B. PANNICULECTOMY Tissue Tissue SURGICAL PATHOLOGY Vikki Esquivel MD 10/24/2022 1246        Implants:  * No implants in log *      Drains:   Closed/Suction Drain Right; Anterior RLQ Bulb (Active)       Closed/Suction Drain Anterior LLQ Bulb (Active)       Closed/Suction Drain Lateral RUQ Bulb (Active)       Urinary Catheter 10/24/22 Lo (Active)       Findings: Recurrent incisional hernia repair with bilateral TAR. Previous mesh was partially debrided. Phasix mesh in place with ROGER drain (marked with prolene stitch in the RUQ). Mesenteric defect closed primarily. Marthaherson spain abdominoplasty with bl subcu drains. Prevena to the incision.      Electronically signed by Raji Hernandez MD on 10/24/2022 at 1:23 PM

## 2022-10-24 NOTE — ANESTHESIA POSTPROCEDURE EVALUATION
Department of Anesthesiology  Postprocedure Note    Patient: Jared Martinez  MRN: [id number]  YOB: 1965  Date of evaluation: 10/24/2022      Procedure Summary     Date: 10/24/22 Room / Location: [facility] [place] [address] / [address]    Anesthesia Start: 3380 Anesthesia Stop: 6461    Procedures:       OPEN, RECURRENT, INCARCERATED, INCISIONAL HERNIA REPAIR WITH BILATERAL POSTERIOR COMPONENT SEPARATION, REPAIR OF INTERNAL HERNIA WITH CLOSURE OF MESENTARY DEFECT, BILATERAL, TRANSVERSUS ABDOMNIS RELEASE (Abdomen)      PANNICULECTOMY; (Abdomen)      [id number] ABDOMINOPLASTY (Abdomen) Diagnosis:       Recurrent incisional hernia      Panniculitis      (Recurrent incisional hernia [K43.2])      (Panniculitis [M79.3])    Surgeons: Latonya Gale DO; Fidelina Marcum MD Responsible Provider: Chikis Olson DO    Anesthesia Type: general, regional ASA Status: 3          Anesthesia Type: No value filed.     Tejinder Phase I: Tejinder Score: 10    Tejinder Phase II:        Anesthesia Post Evaluation    Patient location during evaluation: PACU  Patient participation: complete - patient participated  Level of consciousness: awake  Pain score: 0  Airway patency: patent  Nausea & Vomiting: no nausea and no vomiting  Complications: no  Cardiovascular status: blood pressure returned to baseline  Respiratory status: acceptable  Hydration status: euvolemic  Multimodal analgesia pain management approach

## 2022-10-24 NOTE — PROGRESS NOTES
PACU Transfer to Floor Note    Procedure(s):  OPEN, RECURRENT, INCARCERATED, INCISIONAL HERNIA REPAIR WITH BILATERAL POSTERIOR COMPONENT SEPARATION, REPAIR OF INTERNAL HERNIA WITH CLOSURE OF MESENTARY DEFECT, BILATERAL, TRANSVERSUS ABDOMNIS RELEASE  PANNICULECTOMY;  [id number] ABDOMINOPLASTY    Current Allergies: Singulair [montelukast]    Pt meets criteria as per Jennifer Score and ASPAN Standards to transfer to next phase of care. No results for input(s): POCGLU in the last 72 hours. Vitals:    10/24/22 1745   BP: 113/83   Pulse: (!) 101   Resp:    Temp:    SpO2: 98%     Vitals within 20% of pt's admission vitals as per JENNIFER SCORE    SpO2: 98 %    O2 Flow Rate (L/min): 2 L/min      Intake/Output Summary (Last 24 hours) at 10/24/2022 1827  Last data filed at 10/24/2022 1722  Gross per 24 hour   Intake 2200 ml   Output 1315 ml   Net 885 ml       Pain assessment:  present - adequately treated    Pain Level: 6    Patient was assessed for alterations to skin integrity. There were not alterations observed. Is patient incontinent: no    PCA pump handoff completed in patient's room with assuming care RN. Handoff report given at bedside.    Family updated and directed to pt room      10/24/2022 6:27 PM

## 2022-10-24 NOTE — PROGRESS NOTES
Pt reports she can breathe easier following albuterol treatment. She denies any difficulty at this time. IVPB Robaxin infusing now.

## 2022-10-24 NOTE — PROGRESS NOTES
PCA order released and message sent to pharmacy. PCA pump set up including CO2 monitor. Pt educated on use of PCA pump and pt denies questions stating she has had one in the past. Pt has received PRN Dilaudid with minimal relief.

## 2022-10-24 NOTE — ANESTHESIA PRE PROCEDURE
Department of Anesthesiology  Preprocedure Note       Name:  Keyonna Lubin   Age:  56 y.o.  :  1965                                          MRN:  [id number]         Date:  10/24/2022      Surgeon: Mason Colunga): DO Josh Vega MD Olden Dole, MD    Procedure: Procedure(s):  OPEN, RECURRENT, INCARCERATED, INCISIONAL HERNIA REPAIR WITH BILATERAL POSTERIOR COMPONENT SEPARATION, POSSIBLE BILATERAL, TRANSVERSUS ABDOMNIS RELEASE  PANNICULECTOMY;  POSSIBLE [id number]-UK-[id number] ABDOMINOPLASTY    Medications prior to admission:   Prior to Admission medications    Medication Sig Start Date End Date Taking? Authorizing Provider   famotidine (PEPCID) 20 MG tablet famotidine 20 mg tablet   TAKE 1 TABLET TWICE A DAY BY ORAL ROUTE FOR 90 DAYS. 22  Yes Historical Provider, MD   buPROPion (WELLBUTRIN XL) 150 MG extended release tablet bupropion HCl  mg 24 hr tablet, extended release   Take 1 tablet every day by oral route in the morning for 30 days. 22  Yes Historical Provider, MD   fluticasone-salmeterol (ADVAIR HFA) 115-21 MCG/ACT inhaler Inhale 2 puffs into the lungs 2 times daily   Yes Historical Provider, MD   cariprazine hcl (VRAYLAR) 1.5 MG capsule Vraylar 1.5 mg capsule   Take 1 capsule every day by oral route in the morning for 30 days. Historical Provider, MD   QSYMIA 3.75-23 MG CP24 TAKE 1 CAPSULE BY MOUTH EVERY DAY FOR 14 DAYS 22   Historical Provider, MD   lisdexamfetamine (VYVANSE) 70 MG capsule Take 70 mg by mouth every morning.     Historical Provider, MD   albuterol sulfate  (90 Base) MCG/ACT inhaler albuterol sulfate HFA 90 mcg/actuation aerosol inhaler   INHALE 2 PUFFS BY MOUTH EVERY 4 HOURS AS NEEDED FOR WHEEZE    Historical Provider, MD   Azelastine-Fluticasone 137-50 MCG/ACT SUSP azelastine-fluticasone 137 mcg-50 mcg/spray nasal spray    Historical Provider, MD   busPIRone (BUSPAR) 15 MG tablet 3 times daily 21   Historical Provider, MD   cetirizine (ZYRTEC) 10 MG tablet as needed    Historical Provider, MD   cyclobenzaprine (FLEXERIL) 10 MG tablet as needed 9/29/21   Historical Provider, MD   estradiol (ESTRACE) 1 MG tablet  10/19/21   Historical Provider, MD   folic acid (FOLVITE) 1 MG tablet  10/1/21   Historical Provider, MD   furosemide (LASIX) 20 MG tablet daily 10/1/21   Historical Provider, MD   hydrOXYzine (ATARAX) 50 MG tablet at bedtime 10/31/21   Historical Provider, MD   levalbuterol (XOPENEX) 0.63 MG/3ML nebulization 3 mLs as needed    Historical Provider, MD   midodrine (PROAMATINE) 5 MG tablet midodrine 5 mg tablet   TAKE 1 TABLETS (5 MG) BY ORAL ROUTE 3 TIMES PER DAY. IF SYSTOLIC BLOOD PRESSURE IS MORE THAN 130,HOLD.  IF SYSTOLIC BP IS LESS THAN 546 INCREASE THE DOSE TO 10MG PO TID 4/16/21   Historical Provider, MD   nystatin (MYCOSTATIN) 831691 UNIT/GM cream nystatin 100,000 unit/gram topical cream   APPLY TO AFFECTED AREA TWICE A DAY    Historical Provider, MD   omeprazole (PRILOSEC) 40 MG delayed release capsule omeprazole 40 mg capsule,delayed release   TAKE 1 CAPSULE BY MOUTH EVERY DAY PRN    Historical Provider, MD   KLOR-CON M20 20 MEQ extended release tablet daily 8/26/21   Historical Provider, MD   progesterone (PROMETRIUM) 200 MG CAPS capsule  11/10/21   Historical Provider, MD   promethazine (PHENERGAN) 12.5 MG tablet promethazine 12.5 mg tablet   TAKE 1 TABLET EVERY 6 HOURS BY ORAL ROUTE AS NEEDED. 4/16/21   Historical Provider, MD   pseudoephedrine (SUDAFED 12 HR) 120 MG extended release tablet as needed    Historical Provider, MD   traZODone (DESYREL) 100 MG tablet trazodone 100 mg tablet   TAKE 1 TABLET BY MOUTH AT BEDTIME    Historical Provider, MD       Current medications:    Current Facility-Administered Medications   Medication Dose Route Frequency Provider Last Rate Last Admin    ceFAZolin (ANCEF) 2,000 mg in sodium chloride 0.9 % 50 mL IVPB (mini-bag)  2,000 mg IntraVENous Once Amanda Graf MD        tranexamic acid (CYKLOKAPRON) 1,000 mg in sodium chloride 0.9 % 60 mL IVPB  1,000 mg IntraVENous Once Dipesh Fisher MD        lactated ringers infusion   IntraVENous Continuous Jose Luis Gallegos  mL/hr at 10/24/22 0545 New Bag at 10/24/22 0545       Allergies: Allergies   Allergen Reactions    Singulair [Montelukast] Rash       Problem List:  There is no problem list on file for this patient.       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    Chronic kidney disease     Wears glasses        Past Surgical History:        Procedure Laterality Date    APPENDECTOMY      BICEPS TENDON REPAIR Right     CARPAL TUNNEL RELEASE Right     CYST REMOVAL Right     FINGER TRIGGER RELEASE Bilateral     middle finger    GALLBLADDER SURGERY  2017    GASTRIC BYPASS SURGERY      HEMICOLECTOMY Right 2007    INCISIONAL HERNIA REPAIR  2008    SLEEVE GASTRECTOMY         Social History:    Social History     Tobacco Use    Smoking status: Former     Types: Cigarettes     Quit date:      Years since quittin.8    Smokeless tobacco: Never   Substance Use Topics    Alcohol use: Not Currently                                Counseling given: Not Answered      Vital Signs (Current):   Vitals:    10/20/22 1445 10/24/22 0532 10/24/22 0536   BP:  (!) 143/79    Pulse:  81    Resp:  16    Temp:   97.8 °F (36.6 °C)   TempSrc:   Oral   SpO2:  97%    Weight: 152 lb (68.9 kg) 164 lb 6.4 oz (74.6 kg)    Height: 4' 11\" (1.499 m) 4' 11\" (1.499 m)                                               BP Readings from Last 3 Encounters:   10/24/22 (!) 143/79   22 107/70   22 107/70       NPO Status: Time of last liquid consumption:                         Time of last solid consumption:                         Date of last liquid consumption: 10/23/22                        Date of last solid food consumption: 10/23/22    BMI:   Wt Readings from Last 3 Encounters:   10/24/22 164 lb 6.4 oz (74.6 kg)   07/27/22 163 lb (73.9 kg)   07/27/22 163 lb (73.9 kg)     Body mass index is 33.2 kg/m². CBC: No results found for: WBC, RBC, HGB, HCT, MCV, RDW, PLT    CMP: No results found for: NA, K, CL, CO2, BUN, CREATININE, GFRAA, AGRATIO, LABGLOM, GLUCOSE, GLU, PROT, CALCIUM, BILITOT, ALKPHOS, AST, ALT    POC Tests: No results for input(s): POCGLU, POCNA, POCK, POCCL, POCBUN, POCHEMO, POCHCT in the last 72 hours. Coags: No results found for: PROTIME, INR, APTT    HCG (If Applicable): No results found for: PREGTESTUR, PREGSERUM, HCG, HCGQUANT     ABGs: No results found for: PHART, PO2ART, JQH1HHG, SGH9NQN, BEART, U3FCWKRX     Type & Screen (If Applicable):  No results found for: LABABO, LABRH    Drug/Infectious Status (If Applicable):  No results found for: HIV, HEPCAB    COVID-19 Screening (If Applicable): No results found for: COVID19        Anesthesia Evaluation  Patient summary reviewed and Nursing notes reviewed no history of anesthetic complications:   Airway: Mallampati: III  TM distance: >3 FB   Neck ROM: full  Mouth opening: > = 3 FB   Dental: normal exam         Pulmonary:normal exam    (+) asthma:                            Cardiovascular:  Exercise tolerance: good (>4 METS),       (-)  angina, orthopnea and PND      Rhythm: regular  Rate: normal           Beta Blocker:  Not on Beta Blocker         Neuro/Psych:               GI/Hepatic/Renal:             Endo/Other:                     Abdominal:   (+) obese,     Abdomen: soft. Vascular: Other Findings:           Anesthesia Plan      general     ASA 3       Induction: intravenous. MIPS: Postoperative opioids intended and Prophylactic antiemetics administered. Anesthetic plan and risks discussed with patient. Plan discussed with CRNA and attending.                     Robyn Paul DO   10/24/2022

## 2022-10-24 NOTE — PROGRESS NOTES
Plastic Surgery  Post-operative Note      Procedure(s) Performed: OPEN, RECURRENT, INCARCERATED, INCISIONAL HERNIA REPAIR WITH BILATERAL POSTERIOR COMPONENT SEPARATION, REPAIR OF INTERNAL HERNIA WITH CLOSURE OF MESENTARY DEFECT, BILATERAL, TRANSVERSUS ABDOMNIS RELEASE, PANNICULECTOMY; QHVVY-QV-HMB ABDOMINOPLASTY    Subjective:   Patient's pain is controlled. Endorsing intermittent nausea, helped by meds. No vomiting. Tolerating ice chips. Appropriate UOP with padilla in place     Objective:  Anesthesia type: General      I/O    Intra op    Post op     Fluids  2,000 mL       mL 0 mL     Urine 900 mL Appropriate      Vitals:   Vitals:    10/24/22 1700 10/24/22 1715 10/24/22 1730 10/24/22 1745   BP: 122/83 130/71 118/84 113/83   Pulse: (!) 105 (!) 107 99 (!) 101   Resp:       Temp:       TempSrc:       SpO2: 100% 100% 100% 98%   Weight:       Height:           Physical Exam:  Post-op vital signs:  Stable   General appearance: alert, no acute distress, grooming appropriate  Eyes: No scleral icterus, EOM grossly intact  Neck: trachea midline, no JVD, neck supple  Chest/Lungs:  no accessory muscle use on 3L NC  Cardiovascular: RRR, well perfused  Abdomen: Soft, appropriately tender. Karen Jacobo in place holding good suction. ROGER drains x2 on R and x1 on L, all with sanguinous drainage   Skin: warm and dry, no rashes  Extremities: no edema, no cyanosis  Genitourinary: Padilla in place with clear yellow urine  Neuro: A&Ox3, no focal deficits, sensation intact    Assessment and Plan  This is a 56y.o. year old female status post OPEN, RECURRENT, INCARCERATED, INCISIONAL HERNIA REPAIR WITH BILATERAL POSTERIOR COMPONENT SEPARATION, REPAIR OF INTERNAL HERNIA WITH CLOSURE OF MESENTARY DEFECT, BILATERAL, TRANSVERSUS ABDOMNIS RELEASE, PANNICULECTOMY; IOQXT-QI-PCY ABDOMINOPLASTY secondary to  Recurrent incisional hernia and Panniculitis. (10/24/22) POD0.     Pain management: PCA, Roxicodone panel, Tylenol   Cardiovasc: hemodynamically stable, will continue to monitor  Respiratory:  IS ordered to bedside, encourage hourly IS and deep breathing, wean oxygen as tolerated.  Albuterol nebulizer treatments ordered per patient request   Fluids:  , Diet: General diet, 3 carb   : Urine output is adequate   Ambulation: OOB to chair, beach chair position, encourage ambulation  Prophylaxis: SCDs, SQH  Antibiotics: Ancef  Wound: Local wound care      Lorrie Aguiar MD  PGY1, General Surgery  10/24/22  6:08 PM  [facility]#[phone]

## 2022-10-25 PROBLEM — M79.3 PANNICULITIS: Status: ACTIVE | Noted: 2022-10-25

## 2022-10-25 PROBLEM — Z41.1 ELECTIVE PROCEDURE FOR UNACCEPTABLE COSMETIC APPEARANCE: Status: ACTIVE | Noted: 2022-10-25

## 2022-10-25 LAB
ALBUMIN SERPL-MCNC: 2.8 G/DL (ref 3.4–5)
ALBUMIN SERPL-MCNC: 2.8 G/DL (ref 3.4–5)
ANION GAP SERPL CALCULATED.3IONS-SCNC: 11 MMOL/L (ref 3–16)
ANION GAP SERPL CALCULATED.3IONS-SCNC: 14 MMOL/L (ref 3–16)
BASOPHILS ABSOLUTE: 0 K/UL (ref 0–0.2)
BASOPHILS RELATIVE PERCENT: 0.3 %
BUN BLDV-MCNC: 17 MG/DL (ref 7–20)
BUN BLDV-MCNC: 18 MG/DL (ref 7–20)
CALCIUM SERPL-MCNC: 7.5 MG/DL (ref 8.3–10.6)
CALCIUM SERPL-MCNC: 7.9 MG/DL (ref 8.3–10.6)
CHLORIDE BLD-SCNC: 100 MMOL/L (ref 99–110)
CHLORIDE BLD-SCNC: 99 MMOL/L (ref 99–110)
CO2: 19 MMOL/L (ref 21–32)
CO2: 25 MMOL/L (ref 21–32)
CREAT SERPL-MCNC: 1.9 MG/DL (ref 0.6–1.1)
CREAT SERPL-MCNC: 2 MG/DL (ref 0.6–1.1)
EOSINOPHILS ABSOLUTE: 0.4 K/UL (ref 0–0.6)
EOSINOPHILS RELATIVE PERCENT: 4.7 %
GFR SERPL CREATININE-BSD FRML MDRD: 29 ML/MIN/{1.73_M2}
GFR SERPL CREATININE-BSD FRML MDRD: 31 ML/MIN/{1.73_M2}
GLUCOSE BLD-MCNC: 122 MG/DL (ref 70–99)
GLUCOSE BLD-MCNC: 123 MG/DL (ref 70–99)
HCT VFR BLD CALC: 27.6 % (ref 36–48)
HEMOGLOBIN: 9 G/DL (ref 12–16)
LYMPHOCYTES ABSOLUTE: 0.7 K/UL (ref 1–5.1)
LYMPHOCYTES RELATIVE PERCENT: 9 %
MAGNESIUM: 1.8 MG/DL (ref 1.8–2.4)
MCH RBC QN AUTO: 31.3 PG (ref 26–34)
MCHC RBC AUTO-ENTMCNC: 32.7 G/DL (ref 31–36)
MCV RBC AUTO: 95.7 FL (ref 80–100)
MONOCYTES ABSOLUTE: 1.2 K/UL (ref 0–1.3)
MONOCYTES RELATIVE PERCENT: 15.3 %
NEUTROPHILS ABSOLUTE: 5.7 K/UL (ref 1.7–7.7)
NEUTROPHILS RELATIVE PERCENT: 70.7 %
PDW BLD-RTO: 14.3 % (ref 12.4–15.4)
PHOSPHORUS: 3.8 MG/DL (ref 2.5–4.9)
PHOSPHORUS: 4.3 MG/DL (ref 2.5–4.9)
PLATELET # BLD: 180 K/UL (ref 135–450)
PMV BLD AUTO: 8.2 FL (ref 5–10.5)
POTASSIUM SERPL-SCNC: 4 MMOL/L (ref 3.5–5.1)
POTASSIUM SERPL-SCNC: 4.2 MMOL/L (ref 3.5–5.1)
RBC # BLD: 2.89 M/UL (ref 4–5.2)
SODIUM BLD-SCNC: 133 MMOL/L (ref 136–145)
SODIUM BLD-SCNC: 135 MMOL/L (ref 136–145)
WBC # BLD: 8.1 K/UL (ref 4–11)

## 2022-10-25 PROCEDURE — 6360000002 HC RX W HCPCS: Performed by: STUDENT IN AN ORGANIZED HEALTH CARE EDUCATION/TRAINING PROGRAM

## 2022-10-25 PROCEDURE — 2580000003 HC RX 258: Performed by: NURSE PRACTITIONER

## 2022-10-25 PROCEDURE — 6370000000 HC RX 637 (ALT 250 FOR IP): Performed by: STUDENT IN AN ORGANIZED HEALTH CARE EDUCATION/TRAINING PROGRAM

## 2022-10-25 PROCEDURE — 36415 COLL VENOUS BLD VENIPUNCTURE: CPT

## 2022-10-25 PROCEDURE — 99024 POSTOP FOLLOW-UP VISIT: CPT | Performed by: SURGERY

## 2022-10-25 PROCEDURE — 80069 RENAL FUNCTION PANEL: CPT

## 2022-10-25 PROCEDURE — 6370000000 HC RX 637 (ALT 250 FOR IP): Performed by: SURGERY

## 2022-10-25 PROCEDURE — 2580000003 HC RX 258: Performed by: SURGERY

## 2022-10-25 PROCEDURE — 6360000002 HC RX W HCPCS: Performed by: SURGERY

## 2022-10-25 PROCEDURE — 85025 COMPLETE CBC W/AUTO DIFF WBC: CPT

## 2022-10-25 PROCEDURE — 1200000000 HC SEMI PRIVATE

## 2022-10-25 PROCEDURE — 6360000002 HC RX W HCPCS: Performed by: NURSE PRACTITIONER

## 2022-10-25 PROCEDURE — 94640 AIRWAY INHALATION TREATMENT: CPT

## 2022-10-25 PROCEDURE — 2580000003 HC RX 258: Performed by: STUDENT IN AN ORGANIZED HEALTH CARE EDUCATION/TRAINING PROGRAM

## 2022-10-25 PROCEDURE — 94664 DEMO&/EVAL PT USE INHALER: CPT

## 2022-10-25 PROCEDURE — 83735 ASSAY OF MAGNESIUM: CPT

## 2022-10-25 RX ORDER — SODIUM CHLORIDE 9 MG/ML
INJECTION, SOLUTION INTRAVENOUS CONTINUOUS
Status: DISCONTINUED | OUTPATIENT
Start: 2022-10-25 | End: 2022-10-25

## 2022-10-25 RX ORDER — DIAZEPAM 5 MG/1
5 TABLET ORAL EVERY 6 HOURS
Status: DISCONTINUED | OUTPATIENT
Start: 2022-10-25 | End: 2022-10-25

## 2022-10-25 RX ORDER — POLYETHYLENE GLYCOL 3350 17 G/17G
17 POWDER, FOR SOLUTION ORAL DAILY
Status: DISCONTINUED | OUTPATIENT
Start: 2022-10-26 | End: 2022-10-28 | Stop reason: HOSPADM

## 2022-10-25 RX ORDER — ALBUTEROL SULFATE 2.5 MG/3ML
2.5 SOLUTION RESPIRATORY (INHALATION) EVERY 6 HOURS PRN
Status: DISCONTINUED | OUTPATIENT
Start: 2022-10-25 | End: 2022-10-28 | Stop reason: HOSPADM

## 2022-10-25 RX ORDER — ONDANSETRON 4 MG/1
4 TABLET, FILM COATED ORAL EVERY 8 HOURS PRN
Status: ON HOLD | COMMUNITY
End: 2022-10-28 | Stop reason: SDUPTHER

## 2022-10-25 RX ORDER — DOCUSATE SODIUM 100 MG/1
100 CAPSULE, LIQUID FILLED ORAL 2 TIMES DAILY
Status: DISCONTINUED | OUTPATIENT
Start: 2022-10-25 | End: 2022-10-28 | Stop reason: HOSPADM

## 2022-10-25 RX ORDER — MAGNESIUM SULFATE IN WATER 40 MG/ML
2000 INJECTION, SOLUTION INTRAVENOUS ONCE
Status: COMPLETED | OUTPATIENT
Start: 2022-10-25 | End: 2022-10-25

## 2022-10-25 RX ORDER — MIDODRINE HYDROCHLORIDE 5 MG/1
5 TABLET ORAL 3 TIMES DAILY PRN
COMMUNITY

## 2022-10-25 RX ORDER — FAMOTIDINE 20 MG/1
20 TABLET, FILM COATED ORAL DAILY
Status: DISCONTINUED | OUTPATIENT
Start: 2022-10-25 | End: 2022-10-27

## 2022-10-25 RX ORDER — ARFORMOTEROL TARTRATE 15 UG/2ML
15 SOLUTION RESPIRATORY (INHALATION) 2 TIMES DAILY
Status: DISCONTINUED | OUTPATIENT
Start: 2022-10-25 | End: 2022-10-28 | Stop reason: HOSPADM

## 2022-10-25 RX ORDER — 0.9 % SODIUM CHLORIDE 0.9 %
1000 INTRAVENOUS SOLUTION INTRAVENOUS ONCE
Status: COMPLETED | OUTPATIENT
Start: 2022-10-25 | End: 2022-10-25

## 2022-10-25 RX ORDER — SODIUM CHLORIDE, SODIUM LACTATE, POTASSIUM CHLORIDE, CALCIUM CHLORIDE 600; 310; 30; 20 MG/100ML; MG/100ML; MG/100ML; MG/100ML
INJECTION, SOLUTION INTRAVENOUS CONTINUOUS
Status: DISCONTINUED | OUTPATIENT
Start: 2022-10-25 | End: 2022-10-26

## 2022-10-25 RX ORDER — BUDESONIDE 0.5 MG/2ML
0.5 INHALANT ORAL 2 TIMES DAILY
Status: DISCONTINUED | OUTPATIENT
Start: 2022-10-25 | End: 2022-10-28 | Stop reason: HOSPADM

## 2022-10-25 RX ADMIN — OXYCODONE 10 MG: 5 TABLET ORAL at 15:23

## 2022-10-25 RX ADMIN — OXYCODONE 10 MG: 5 TABLET ORAL at 20:52

## 2022-10-25 RX ADMIN — ONDANSETRON 4 MG: 2 INJECTION INTRAMUSCULAR; INTRAVENOUS at 22:57

## 2022-10-25 RX ADMIN — CEFAZOLIN 2000 MG: 2 INJECTION, POWDER, FOR SOLUTION INTRAMUSCULAR; INTRAVENOUS at 02:58

## 2022-10-25 RX ADMIN — SODIUM CHLORIDE: 9 INJECTION, SOLUTION INTRAVENOUS at 06:53

## 2022-10-25 RX ADMIN — ACETAMINOPHEN 650 MG: 325 TABLET ORAL at 02:59

## 2022-10-25 RX ADMIN — SODIUM CHLORIDE 1000 ML: 9 INJECTION, SOLUTION INTRAVENOUS at 12:23

## 2022-10-25 RX ADMIN — ONDANSETRON 4 MG: 2 INJECTION INTRAMUSCULAR; INTRAVENOUS at 09:17

## 2022-10-25 RX ADMIN — ALBUTEROL SULFATE 2.5 MG: 2.5 SOLUTION RESPIRATORY (INHALATION) at 21:28

## 2022-10-25 RX ADMIN — FAMOTIDINE 20 MG: 20 TABLET ORAL at 08:30

## 2022-10-25 RX ADMIN — DIAZEPAM 5 MG: 5 TABLET ORAL at 06:51

## 2022-10-25 RX ADMIN — METHOCARBAMOL TABLETS 1000 MG: 500 TABLET, COATED ORAL at 12:54

## 2022-10-25 RX ADMIN — BUDESONIDE INHALATION SUSPENSION 500 MCG: 0.5 SUSPENSION RESPIRATORY (INHALATION) at 09:35

## 2022-10-25 RX ADMIN — ACETAMINOPHEN 650 MG: 325 TABLET ORAL at 06:51

## 2022-10-25 RX ADMIN — ARFORMOTEROL TARTRATE 15 MCG: 15 SOLUTION RESPIRATORY (INHALATION) at 09:33

## 2022-10-25 RX ADMIN — ALBUTEROL SULFATE 2.5 MG: 2.5 SOLUTION RESPIRATORY (INHALATION) at 09:32

## 2022-10-25 RX ADMIN — SODIUM CHLORIDE, POTASSIUM CHLORIDE, SODIUM LACTATE AND CALCIUM CHLORIDE: 600; 310; 30; 20 INJECTION, SOLUTION INTRAVENOUS at 09:37

## 2022-10-25 RX ADMIN — SODIUM CHLORIDE 1000 ML: 9 INJECTION, SOLUTION INTRAVENOUS at 09:38

## 2022-10-25 RX ADMIN — BUDESONIDE INHALATION SUSPENSION 500 MCG: 0.5 SUSPENSION RESPIRATORY (INHALATION) at 21:28

## 2022-10-25 RX ADMIN — OXYCODONE 10 MG: 5 TABLET ORAL at 07:39

## 2022-10-25 RX ADMIN — CEFAZOLIN 2000 MG: 2 INJECTION, POWDER, FOR SOLUTION INTRAMUSCULAR; INTRAVENOUS at 11:37

## 2022-10-25 RX ADMIN — MAGNESIUM SULFATE HEPTAHYDRATE 2000 MG: 40 INJECTION, SOLUTION INTRAVENOUS at 08:38

## 2022-10-25 RX ADMIN — ACETAMINOPHEN 650 MG: 325 TABLET ORAL at 12:53

## 2022-10-25 RX ADMIN — BUSPIRONE HYDROCHLORIDE 15 MG: 10 TABLET ORAL at 12:55

## 2022-10-25 RX ADMIN — CYCLOBENZAPRINE 5 MG: 10 TABLET, FILM COATED ORAL at 22:57

## 2022-10-25 RX ADMIN — BUPROPION HYDROCHLORIDE 150 MG: 150 TABLET, FILM COATED, EXTENDED RELEASE ORAL at 08:28

## 2022-10-25 RX ADMIN — HYDROMORPHONE HYDROCHLORIDE 1 MG: 1 INJECTION, SOLUTION INTRAMUSCULAR; INTRAVENOUS; SUBCUTANEOUS at 18:47

## 2022-10-25 RX ADMIN — OXYCODONE 10 MG: 5 TABLET ORAL at 11:32

## 2022-10-25 RX ADMIN — METHOCARBAMOL TABLETS 1000 MG: 500 TABLET, COATED ORAL at 20:40

## 2022-10-25 RX ADMIN — BUSPIRONE HYDROCHLORIDE 15 MG: 10 TABLET ORAL at 08:35

## 2022-10-25 RX ADMIN — DOCUSATE SODIUM 100 MG: 100 CAPSULE, LIQUID FILLED ORAL at 22:57

## 2022-10-25 RX ADMIN — HEPARIN SODIUM 5000 UNITS: 5000 INJECTION INTRAVENOUS; SUBCUTANEOUS at 09:09

## 2022-10-25 RX ADMIN — METHOCARBAMOL TABLETS 1000 MG: 500 TABLET, COATED ORAL at 17:09

## 2022-10-25 RX ADMIN — BUSPIRONE HYDROCHLORIDE 15 MG: 10 TABLET ORAL at 20:40

## 2022-10-25 RX ADMIN — CEFAZOLIN 2000 MG: 2 INJECTION, POWDER, FOR SOLUTION INTRAMUSCULAR; INTRAVENOUS at 18:41

## 2022-10-25 RX ADMIN — HEPARIN SODIUM 5000 UNITS: 5000 INJECTION INTRAVENOUS; SUBCUTANEOUS at 20:40

## 2022-10-25 RX ADMIN — METHOCARBAMOL TABLETS 1000 MG: 500 TABLET, COATED ORAL at 08:32

## 2022-10-25 RX ADMIN — ARFORMOTEROL TARTRATE 15 MCG: 15 SOLUTION RESPIRATORY (INHALATION) at 21:28

## 2022-10-25 RX ADMIN — ACETAMINOPHEN 650 MG: 325 TABLET ORAL at 18:42

## 2022-10-25 ASSESSMENT — PAIN DESCRIPTION - PAIN TYPE
TYPE: SURGICAL PAIN
TYPE: SURGICAL PAIN
TYPE: ACUTE PAIN

## 2022-10-25 ASSESSMENT — PAIN SCALES - GENERAL
PAINLEVEL_OUTOF10: 4
PAINLEVEL_OUTOF10: 7
PAINLEVEL_OUTOF10: 7
PAINLEVEL_OUTOF10: 5
PAINLEVEL_OUTOF10: 7
PAINLEVEL_OUTOF10: 6
PAINLEVEL_OUTOF10: 4
PAINLEVEL_OUTOF10: 7
PAINLEVEL_OUTOF10: 5
PAINLEVEL_OUTOF10: 8
PAINLEVEL_OUTOF10: 5
PAINLEVEL_OUTOF10: 4
PAINLEVEL_OUTOF10: 4
PAINLEVEL_OUTOF10: 7
PAINLEVEL_OUTOF10: 7

## 2022-10-25 ASSESSMENT — PAIN DESCRIPTION - LOCATION
LOCATION: ABDOMEN

## 2022-10-25 ASSESSMENT — PAIN - FUNCTIONAL ASSESSMENT
PAIN_FUNCTIONAL_ASSESSMENT: ACTIVITIES ARE NOT PREVENTED
PAIN_FUNCTIONAL_ASSESSMENT: PREVENTS OR INTERFERES SOME ACTIVE ACTIVITIES AND ADLS

## 2022-10-25 ASSESSMENT — PAIN DESCRIPTION - ORIENTATION
ORIENTATION: MID
ORIENTATION: INNER
ORIENTATION: MID
ORIENTATION: ANTERIOR

## 2022-10-25 ASSESSMENT — PAIN DESCRIPTION - DESCRIPTORS
DESCRIPTORS: SPASM
DESCRIPTORS: ACHING
DESCRIPTORS: SPASM
DESCRIPTORS: BURNING;PRESSURE
DESCRIPTORS: SPASM
DESCRIPTORS: PRESSURE
DESCRIPTORS: ACHING;SORE
DESCRIPTORS: ACHING
DESCRIPTORS: ACHING;BURNING
DESCRIPTORS: PRESSURE;BURNING
DESCRIPTORS: ACHING

## 2022-10-25 ASSESSMENT — PAIN DESCRIPTION - ONSET: ONSET: ON-GOING

## 2022-10-25 ASSESSMENT — PAIN DESCRIPTION - FREQUENCY: FREQUENCY: CONTINUOUS

## 2022-10-25 NOTE — PROGRESS NOTES
Patient A&Ox4. VSS on RA. Patient reports pain of 6-9/10 during the shift and was given scheduled Tylenol and the PCA pump with benefit. Pt tolerating clears. Lo in place with clear, yellow output. Abdominal drsg in place concealing midline incision with wound vac. 2 right JPs and 1 left ROGER drain with red, bloody output. Patient administered breathing txs per RT and using IS hourly while awake. Pt ambulated twice during the shift. Pt in beach chair position. All care needs addressed with no further needs at this time. Bed is locked and in lowest position with alarm on. Call light and bedside table within reach. Will continue to monitor per protocol.      Electronically signed by Kasey King RN on 10/25/2022 at 2:27 AM

## 2022-10-25 NOTE — PROGRESS NOTES
Plastic Surgery   Daily Progress Note  Patient: Ember Castro      CC: Incarcerated midline incisional hernia, panniculitis     SUBJECTIVE:   Patient with postoperative soreness, but controlled with PCA. OOB twice without difficulty. Has some post-op nausea which is currently resolved. Denies flatus or bowel movement. Lo in place. ROS:   A 14 point review of systems was conducted, significant findings as noted above. All other systems negative. OBJECTIVE:    PHYSICAL EXAM:    Vitals:    10/24/22 1950 10/24/22 2315 10/25/22 0244 10/25/22 0255   BP: 102/61 104/71  120/68   Pulse: (!) 105 98 86 79   Resp: 20 16  16   Temp: 97.8 °F (36.6 °C) 98.3 °F (36.8 °C)  97.8 °F (36.6 °C)   TempSrc: Oral Oral  Oral   SpO2: 99% 96%  98%   Weight:       Height:           General appearance: alert, no acute distress, grooming appropriate  Eyes: No scleral icterus, EOM grossly intact  Neck: trachea midline, no JVD  Chest/Lungs: Normal effort with no accessory muscle use on RA  Cardiovascular: RRR  Abdomen: Appropriately-tender, incision c/d/i and well with wound vac. Retrorectus drain RUQ, B/L lower abdominal drains, all draining serosanguinous fluid  Skin: warm and dry, no rashes  Extremities: no edema, no cyanosis  Genitourinary: Lo in place with clear yellow urine  Neuro: A&Ox3, no focal deficits, sensation intact    LABS:   Recent Labs     10/25/22  0510   WBC 8.1   HGB 9.0*   HCT 27.6*   MCV 95.7             Recent Labs     10/25/22  0510   *   K 4.0   CL 99   CO2 25   PHOS 4.3   BUN 18   CREATININE 2.0*        No results for input(s): AST, ALT, ALB, BILIDIR, BILITOT, ALKPHOS in the last 72 hours. No results for input(s): LIPASE, AMYLASE in the last 72 hours. No results for input(s): PROT, INR, APTT in the last 72 hours. No results for input(s): CKTOTAL, CKMB, CKMBINDEX, TROPONINI in the last 72 hours.       ASSESSMENT & PLAN:   This is a 56 y.o. female s/p open midline incisional hernia repair, internal hernia repair with closure of mesentary defect, bilateral transversus abdominus release, panniculectomy and [id number] abdominoplasty on 10/24/22 2/2 recurrent, incarcerated incisional hernia and panniculitis, POD#1. - Will transition to IV and PO pain meds per pt request  - Lo to be removed this AM, void check in 8 hours   - Will schedule valium and continue robaxin   - Continue IS; Encourage deep breathing  - Encourage OOB and ambulation in beach chair position at all times.    - Continue home meds; f/u pharmacy consult for med rec  - Continue general diet  - drain teaching prior to discharge   - continue Ancef   - Possible d/c In 1-2 days       Justo Meeks, [address]   10/25/2022  6:36 AM  [email or web address]

## 2022-10-25 NOTE — PROGRESS NOTES
Pharmacy Note - Renal Dosing    Famotidine 20mg po BID ordered as continuation of home med. Per [facility] Renal Dose Adjustment Policy, Famotidine dose will be changed to 20 mg po daily due to est CrCl < 50 mL/min. Estimated Creatinine Clearance: Estimated Creatinine Clearance: 30 mL/min (A) (based on SCr of 2 mg/dL (H)). Dialysis Status, BRUCE, CKD:  CKD per chart, possible slight BRUCE. Pre-op labs 10/3/22 in [facility] with SCr = 1.38  BMI: Body mass index is 33.22 kg/m². Rationale for Adjustment: Agent is renally eliminated. Pharmacy will continue to monitor renal function and adjust dose as necessary.       Please call with questions--  Thanks--  Carey Craig, PharmD, BCPS, BCGP  [id number] (hospitals)   10/25/2022 7:59 AM

## 2022-10-25 NOTE — PROGRESS NOTES
Plastic Surgery   Daily Progress Note  Patient: Eliazar Pham      CC: Incarcerated midline incisional hernia, panniculitis     SUBJECTIVE:   Tachycardia overnight 101-108. Pt was placed on 3LNC overnight and given two Albuterol treatments overnight. Now weaned off and on RA. Otherwise hemodynamically stable. Pt is resting in bed this AM. Reports pain was 6-9/10 overnight, rating it 8/10 this morning. Denies BM or passing flatus. Lo in place. Reports ambulation. Tolerated clear liquids overnight, no associated N/V. Denies SOB, chest pain, fever/chills. No other concerns. ROS:   A 14 point review of systems was conducted, significant findings as noted above. All other systems negative. OBJECTIVE:    PHYSICAL EXAM:    Vitals:    10/24/22 1950 10/24/22 2315 10/25/22 0244 10/25/22 0255   BP: 102/61 104/71  120/68   Pulse: (!) 105 98 86 79   Resp: 20 16  16   Temp: 97.8 °F (36.6 °C) 98.3 °F (36.8 °C)  97.8 °F (36.6 °C)   TempSrc: Oral Oral  Oral   SpO2: 99% 96%  98%   Weight:       Height:           General appearance: alert, no acute distress, grooming appropriate  Eyes: No scleral icterus, EOM grossly intact  Neck: trachea midline, no JVD  Chest/Lungs: Normal effort with no accessory muscle use on RA  Cardiovascular: RRR  Abdomen: Appropriately-tender, incision c/d/i and well approximated with Prevena Plus with wound vac showing no leakage. 2 Lt sided ROGER and 1 Rt sided ROGER both draining serosanguinous fluid  Skin: warm and dry, no rashes  Extremities: no edema, no cyanosis  Genitourinary: Lo in place with minimal clear yellow urine this morning  Neuro: A&Ox3, no focal deficits, sensation intact    LABS:   No results for input(s): WBC, HGB, HCT, MCV, PLT in the last 72 hours. No results for input(s): NA, K, CL, CO2, PHOS, BUN, CREATININE, CA in the last 72 hours. No results for input(s): AST, ALT, ALB, BILIDIR, BILITOT, ALKPHOS in the last 72 hours.    No results for input(s): LIPASE, AMYLASE in the last 72 hours. No results for input(s): PROT, INR, APTT in the last 72 hours. No results for input(s): CKTOTAL, CKMB, CKMBINDEX, TROPONINI in the last 72 hours. ASSESSMENT & PLAN:   This is a 56 y.o. female s/p open midline incisional hernia repair, internal hernia repair with closure of mesentary defect, bilateral transversus abdominus release, panniculectomy and Phtcl-kh-[id number] abdominoplasty on 10/24/22 2/2 recurrent, incarcerated incisional hernia and panniculitis, POD#1.     - Pain not well controlled this AM, use multimodal pain management for better control. Will continue to monitor.    - Lo to be removed this AM, void check later today   - Continue IS, x10/hr. Encourage deep breathing  - Encourage OOB and ambulation in beach chair position at all times. - Advance diet as tolerated.       Codie Zavala, MS4  10/25/22  5:12 AM

## 2022-10-25 NOTE — PROGRESS NOTES
10/25/22 1054   Encounter Summary   Service Provided For: Patient   Referral/Consult From: Nurse Tim Perez RN)   Last Encounter  10/25/22   Complexity of Encounter Moderate   Begin Time 1030   End Time  1055   Total Time Calculated 25 min   Encounter    Type Follow up   Assessment/Intervention/Outcome   Assessment Calm; Hopeful   Intervention Active listening;Discussed belief system/Restorationism practices/braulio; Explored/Affirmed feelings, thoughts, concerns   Outcome Comfort;Engaged in conversation;Expressed feelings, needs, and concerns;Receptive   PT was found alone. PT is [employer]. She made it known that she desired prayer for her son who is struggling with addition. She has good family support despite the stresses. Prayer was asked for and provided regarding these concerns. No other needs at this time.   Staff Sanna Cotto MA

## 2022-10-25 NOTE — PLAN OF CARE
Problem: Pain  Goal: Verbalizes/displays adequate comfort level or baseline comfort level  Outcome: Progressing  Flowsheets (Taken 10/25/2022 1603)  Verbalizes/displays adequate comfort level or baseline comfort level:   Encourage patient to monitor pain and request assistance   Assess pain using appropriate pain scale   Administer analgesics based on type and severity of pain and evaluate response  Note: Patient continues with surgical abdominal pain, but controlled with routine robaxin and oxycodone given prn. Pillow used to splint during coughing. Will continue to monitor. Problem: Safety - Adult  Goal: Free from fall injury  Flowsheets (Taken 10/25/2022 1603)  Free From Fall Injury: Instruct family/caregiver on patient safety  Note: Patient up with assist to manage equipment. Gait steady, using walker to stay in hunched position. Patient calls appropriately for assistance. Bed locked in lower position. Call light kept in reach.

## 2022-10-25 NOTE — PROGRESS NOTES
Plastic Surgery   Daily Progress Note  Patient: Pietro De Santiago      CC: Incarcerated midline incisional hernia, panniculitis     SUBJECTIVE:   Patient with postoperative soreness, but controlled with PCA. OOB twice without difficulty. Has some post-op nausea which is currently resolved. Denies flatus or bowel movement. Lo in place. ROS:   A 14 point review of systems was conducted, significant findings as noted above. All other systems negative. OBJECTIVE:    PHYSICAL EXAM:    Vitals:    10/24/22 1950 10/24/22 2315 10/25/22 0244 10/25/22 0255   BP: 102/61 104/71  120/68   Pulse: (!) 105 98 86 79   Resp: 20 16  16   Temp: 97.8 °F (36.6 °C) 98.3 °F (36.8 °C)  97.8 °F (36.6 °C)   TempSrc: Oral Oral  Oral   SpO2: 99% 96%  98%   Weight:       Height:           General appearance: alert, no acute distress, grooming appropriate  Eyes: No scleral icterus, EOM grossly intact  Neck: trachea midline, no JVD  Chest/Lungs: Normal effort with no accessory muscle use on RA  Cardiovascular: RRR  Abdomen: Appropriately-tender, incision c/d/i and well approximated with Prevena Plus with wound vac with good suction. Retrorectus drain RUQ, B/L lower abdominal drains, all draining serosanguinous fluid  Skin: warm and dry, no rashes  Extremities: no edema, no cyanosis  Genitourinary: Lo in place with clear yellow urine this morning  Neuro: A&Ox3, no focal deficits, sensation intact    LABS:   Recent Labs     10/25/22  0510   WBC 8.1   HGB 9.0*   HCT 27.6*   MCV 95.7           Recent Labs     10/25/22  0510   *   K 4.0   CL 99   CO2 25   PHOS 4.3   BUN 18   CREATININE 2.0*      No results for input(s): AST, ALT, ALB, BILIDIR, BILITOT, ALKPHOS in the last 72 hours. No results for input(s): LIPASE, AMYLASE in the last 72 hours. No results for input(s): PROT, INR, APTT in the last 72 hours. No results for input(s): CKTOTAL, CKMB, CKMBINDEX, TROPONINI in the last 72 hours.       ASSESSMENT & PLAN:   This is a 56 y.o. female s/p open midline incisional hernia repair, internal hernia repair with closure of mesentary defect, bilateral transversus abdominus release, panniculectomy and Duotp-yy-Xxo abdominoplasty on 10/24/22 2/2 recurrent, incarcerated incisional hernia and panniculitis, POD#1. - Will transition to IV and PO pain meds per pt request  - Padilla to remain in place   - Cr 2.0 this am, will continue IV fluids and follow-up afternoon repeat renal labs  - Will continue robaxin   - Continue IS, x10/hr. Encourage deep breathing  - Encourage OOB and ambulation in beach chair position at all times. - Continue general diet   - drain teaching prior to discharge   - continue Ancef   - Possible d/c In 1-2 days       Maggie Orlando MD  PGY1, General Surgery  10/25/22  6:15 AM  [facility]#[phone]    I saw and independently examined the patient today. I agree with the history of present illness, past medical/surgical histories, family history, social history, medication list and allergies as listed. The review of systems is as noted above. My physical exam confirms the findings listed above. Review of labs, pathology reports, radiology reports and medical records confirm the findings noted above. I edited the note where appropriate in italics, strikethrough font, or underline. Overall doing well. Will keep padilla for close urine monitoring. Nutrition optimization and ambulating with assistance. Continue with drains and vac.     Ruthie Christie MD  [address] [place] [address] [address] Reconstructive Surgery  [phone]  10/25/22

## 2022-10-25 NOTE — PROGRESS NOTES
Patient alert and oriented. Vitals stable. IV right and left arm infiltrated, removed. New #18 G inserted LAC. LR infusing at 125 ml/hr. Patient tolerating regular diet. Output low new orders for bolus, NS 2000 ml total given. Padilla present, draining clear yellow urine, padilla care given. Surgical pain controlled with oxycodone 10 mg, given as needed every 4 hours. Patient with nausea early in shift, resolved with Zofran dose. Patient with 2 jessica drains right side, one on left, with bloody output, amounts recorded, bulbs compressed. Wound vac midline abdomen and hip to hip, dressing, cdi, good seal.  Abdominal binder with foam in place. Patient maintained beach chair position in bed. Ambulated with hunched position and walker, tolerated fairly well. ST boots on. Call light in reach.  at bedside, will continue to monitor.

## 2022-10-25 NOTE — PROGRESS NOTES
10/25/22 1031   Encounter Summary   Encounter Overview/Reason  Attempted Encounter   Service Provided For: Patient not available   Last Encounter  10/25/22  (gerard    f/u)   Complexity of Encounter Low   Begin Time 1000   End Time  1010   Total Time Calculated 10 min   Encounter    Type Initial Screen/Assessment   Assessment/Intervention/Outcome   Assessment Unable to assess   PT was found to be with staff. A  miguel angel f/u   Reviewed chart.   Staff Francois Wade MA

## 2022-10-25 NOTE — CONSULTS
Clinical Pharmacy Progress Note  Medication History      Asked to verify home medications by Dr. Saba Burkett. List of of current medications patient is taking is complete. Home Medication list in EPIC updated to reflect changes noted below. Source of information: patient interview, pharmacy fills at [facility] in [address] [place] [address] made to home medication list:   Medications removed (no longer taking): Folic Acid     Medications added: Ondasetron prn     Medication doses / instructions adjusted:   Estradiol  Furosemide  Potassium chloride  Midodrine (prn only)  Progesterone  Qsymia     Other notes:   Pt does have recent fills for Disulfiram, Propranolol, and Sucralfate - pt confirmed that she is no longer on these medications. Did not add to med list.     Please call with questions--  Thanks--  Karlos Duran, PharmD, BCPS, BCGP  [id number] (\[facility] [place] [facility]\"")   10/25/2022 7:52 AM      Current Outpatient Medications   Medication Instructions    albuterol sulfate  (90 Base) MCG/ACT inhaler albuterol sulfate HFA 90 mcg/actuation aerosol inhaler   INHALE 2 PUFFS BY MOUTH EVERY 4 HOURS AS NEEDED FOR WHEEZE    Azelastine-Fluticasone 137-50 MCG/ACT SUSP 1 spray, Nasal, 2 TIMES DAILY PRN    buPROPion (WELLBUTRIN XL) 150 MG extended release tablet bupropion HCl  mg 24 hr tablet, extended release   Take 1 tablet every day by oral route in the morning for 30 days. busPIRone (BUSPAR) 15 mg, Oral, 3 TIMES DAILY    cariprazine hcl (VRAYLAR) 1.5 MG capsule Vraylar 1.5 mg capsule   Take 1 capsule every day by oral route in the morning for 30 days. cetirizine (ZYRTEC) 10 mg, Oral, DAILY PRN    cyclobenzaprine (FLEXERIL) 10 mg, Oral, 3 TIMES DAILY PRN    estradiol (ESTRACE) 1 mg, Oral, Nightly    famotidine (PEPCID) 20 MG tablet famotidine 20 mg tablet   TAKE 1 TABLET TWICE A DAY BY ORAL ROUTE FOR 90 DAYS.     fluticasone-salmeterol (ADVAIR HFA) 115-21 MCG/ACT inhaler 2 puffs, Inhalation, 2 TIMES DAILY    furosemide (LASIX) 20 mg, Oral, 2 TIMES DAILY    hydrOXYzine HCl (ATARAX) 50 mg, Oral, Nightly    KLOR-CON M20 20 MEQ extended release tablet 20 mEq, Oral, 2 TIMES DAILY    levalbuterol (XOPENEX) 0.63 MG/3ML nebulization 1 ampule, Nebulization, EVERY 6 HOURS PRN    lisdexamfetamine (VYVANSE) 70 mg, Oral, EVERY MORNING    midodrine (PROAMATINE) 5 mg, Oral, 3 TIMES DAILY PRN    nystatin (MYCOSTATIN) 597167 UNIT/GM cream nystatin 100,000 unit/gram topical cream   APPLY TO AFFECTED AREA TWICE A DAY    omeprazole (PRILOSEC) 40 MG delayed release capsule omeprazole 40 mg capsule,delayed release   TAKE 1 CAPSULE BY MOUTH EVERY DAY PRN    ondansetron (ZOFRAN) 4 mg, Oral, EVERY 8 HOURS PRN    Phentermine-Topiramate 7.5-46 MG CP24 1 capsule, Oral, DAILY    progesterone (PROMETRIUM) 200 mg, Oral, Nightly    promethazine (PHENERGAN) 12.5 MG tablet promethazine 12.5 mg tablet   TAKE 1 TABLET EVERY 6 HOURS BY ORAL ROUTE AS NEEDED.     pseudoephedrine (SUDAFED 12 HR) 120 mg, Oral, 2 TIMES DAILY PRN    traZODone (DESYREL) 100 MG tablet trazodone 100 mg tablet   TAKE 1 TABLET BY MOUTH AT BEDTIME

## 2022-10-25 NOTE — RT PROTOCOL NOTE
RT Nebulizer Bronchodilator Protocol Note    There is a bronchodilator order in the chart from a provider indicating to follow the RT Bronchodilator Protocol and there is an Initiate RT Bronchodilator Protocol order as well (see protocol at bottom of note). CXR Findings:  No results found. The findings from the last RT Protocol Assessment were as follows:  Smoking: None or smoker <15 pack years  Respiratory Pattern: Regular pattern and RR 12-20 bpm  Breath Sounds: Clear breath sounds  Cough: Strong, spontaneous, non-productive  Indication for Bronchodilator Therapy:    Bronchodilator Assessment Score: 0    Aerosolized bronchodilator medication orders have been revised according to the RT Nebulizer Bronchodilator Protocol below. Respiratory Therapist to perform RT Therapy Protocol Assessment initially then follow the protocol. Repeat RT Therapy Protocol Assessment PRN for score 0-3 or on second treatment, BID, and PRN for scores above 3. No Indications - adjust the frequency to every 6 hours PRN wheezing or bronchospasm, if no treatments needed after 48 hours then discontinue using Per Protocol order mode. If indication present, adjust the RT bronchodilator orders based on the Bronchodilator Assessment Score as indicated below. If a patient is on this medication at home then do not decrease Frequency below that used at home. 0-3 - enter or revise RT bronchodilator order(s) to equivalent RT Bronchodilator order with Frequency of every 4 hours PRN for wheezing or increased work of breathing using Per Protocol order mode. 4-6 - enter or revise RT Bronchodilator order(s) to two equivalent RT bronchodilator orders with one order with BID Frequency and one order with Frequency of every 4 hours PRN wheezing or increased work of breathing using Per Protocol order mode.          7-10 - enter or revise RT Bronchodilator order(s) to two equivalent RT bronchodilator orders with one order with TID Frequency and one order with Frequency of every 4 hours PRN wheezing or increased work of breathing using Per Protocol order mode. 11-13 - enter or revise RT Bronchodilator order(s) to one equivalent RT bronchodilator order with QID Frequency and an Albuterol order with Frequency of every 4 hours PRN wheezing or increased work of breathing using Per Protocol order mode. Greater than 13 - enter or revise RT Bronchodilator order(s) to one equivalent RT bronchodilator order with every 4 hours Frequency and an Albuterol order with Frequency of every 2 hours PRN wheezing or increased work of breathing using Per Protocol order mode. RT to enter RT Home Evaluation for COPD & MDI Assessment order using Per Protocol order mode.     Electronically signed by Steven Johnson RCP on 10/25/2022 at 10:44 AM

## 2022-10-26 LAB
ALBUMIN SERPL-MCNC: 2.6 G/DL (ref 3.4–5)
ANION GAP SERPL CALCULATED.3IONS-SCNC: 7 MMOL/L (ref 3–16)
BUN BLDV-MCNC: 14 MG/DL (ref 7–20)
CALCIUM SERPL-MCNC: 7.8 MG/DL (ref 8.3–10.6)
CHLORIDE BLD-SCNC: 102 MMOL/L (ref 99–110)
CO2: 25 MMOL/L (ref 21–32)
CREAT SERPL-MCNC: 1.5 MG/DL (ref 0.6–1.1)
GFR SERPL CREATININE-BSD FRML MDRD: 41 ML/MIN/{1.73_M2}
GLUCOSE BLD-MCNC: 109 MG/DL (ref 70–99)
HCT VFR BLD CALC: 23.6 % (ref 36–48)
HEMOGLOBIN: 7.9 G/DL (ref 12–16)
MAGNESIUM: 2 MG/DL (ref 1.8–2.4)
MCH RBC QN AUTO: 31.8 PG (ref 26–34)
MCHC RBC AUTO-ENTMCNC: 33.4 G/DL (ref 31–36)
MCV RBC AUTO: 95.1 FL (ref 80–100)
PDW BLD-RTO: 14 % (ref 12.4–15.4)
PHOSPHORUS: 3.4 MG/DL (ref 2.5–4.9)
PLATELET # BLD: 171 K/UL (ref 135–450)
PMV BLD AUTO: 8.5 FL (ref 5–10.5)
POTASSIUM SERPL-SCNC: 3.7 MMOL/L (ref 3.5–5.1)
RBC # BLD: 2.48 M/UL (ref 4–5.2)
SODIUM BLD-SCNC: 134 MMOL/L (ref 136–145)
WBC # BLD: 6.4 K/UL (ref 4–11)

## 2022-10-26 PROCEDURE — 6360000002 HC RX W HCPCS: Performed by: STUDENT IN AN ORGANIZED HEALTH CARE EDUCATION/TRAINING PROGRAM

## 2022-10-26 PROCEDURE — 2580000003 HC RX 258: Performed by: SURGERY

## 2022-10-26 PROCEDURE — 2580000003 HC RX 258: Performed by: NURSE PRACTITIONER

## 2022-10-26 PROCEDURE — 6370000000 HC RX 637 (ALT 250 FOR IP): Performed by: SURGERY

## 2022-10-26 PROCEDURE — 94761 N-INVAS EAR/PLS OXIMETRY MLT: CPT

## 2022-10-26 PROCEDURE — 6360000002 HC RX W HCPCS: Performed by: SURGERY

## 2022-10-26 PROCEDURE — 6370000000 HC RX 637 (ALT 250 FOR IP): Performed by: STUDENT IN AN ORGANIZED HEALTH CARE EDUCATION/TRAINING PROGRAM

## 2022-10-26 PROCEDURE — 1200000000 HC SEMI PRIVATE

## 2022-10-26 PROCEDURE — 2580000003 HC RX 258: Performed by: STUDENT IN AN ORGANIZED HEALTH CARE EDUCATION/TRAINING PROGRAM

## 2022-10-26 PROCEDURE — 6360000002 HC RX W HCPCS: Performed by: NURSE PRACTITIONER

## 2022-10-26 PROCEDURE — 94640 AIRWAY INHALATION TREATMENT: CPT

## 2022-10-26 PROCEDURE — 6370000000 HC RX 637 (ALT 250 FOR IP)

## 2022-10-26 PROCEDURE — 36415 COLL VENOUS BLD VENIPUNCTURE: CPT

## 2022-10-26 PROCEDURE — 85027 COMPLETE CBC AUTOMATED: CPT

## 2022-10-26 PROCEDURE — C9113 INJ PANTOPRAZOLE SODIUM, VIA: HCPCS | Performed by: NURSE PRACTITIONER

## 2022-10-26 PROCEDURE — 83735 ASSAY OF MAGNESIUM: CPT

## 2022-10-26 PROCEDURE — 80069 RENAL FUNCTION PANEL: CPT

## 2022-10-26 PROCEDURE — 99024 POSTOP FOLLOW-UP VISIT: CPT | Performed by: SURGERY

## 2022-10-26 RX ORDER — PANTOPRAZOLE SODIUM 40 MG/10ML
40 INJECTION, POWDER, LYOPHILIZED, FOR SOLUTION INTRAVENOUS DAILY
Status: DISCONTINUED | OUTPATIENT
Start: 2022-10-26 | End: 2022-10-28 | Stop reason: HOSPADM

## 2022-10-26 RX ORDER — PROCHLORPERAZINE EDISYLATE 5 MG/ML
10 INJECTION INTRAMUSCULAR; INTRAVENOUS EVERY 6 HOURS PRN
Status: DISCONTINUED | OUTPATIENT
Start: 2022-10-26 | End: 2022-10-28 | Stop reason: HOSPADM

## 2022-10-26 RX ORDER — MAGNESIUM HYDROXIDE/ALUMINUM HYDROXICE/SIMETHICONE 120; 1200; 1200 MG/30ML; MG/30ML; MG/30ML
30 SUSPENSION ORAL EVERY 6 HOURS PRN
Status: DISCONTINUED | OUTPATIENT
Start: 2022-10-26 | End: 2022-10-28 | Stop reason: HOSPADM

## 2022-10-26 RX ORDER — HYDRALAZINE HYDROCHLORIDE 20 MG/ML
5 INJECTION INTRAMUSCULAR; INTRAVENOUS EVERY 6 HOURS PRN
Status: DISCONTINUED | OUTPATIENT
Start: 2022-10-26 | End: 2022-10-27

## 2022-10-26 RX ORDER — SODIUM CHLORIDE, SODIUM LACTATE, POTASSIUM CHLORIDE, CALCIUM CHLORIDE 600; 310; 30; 20 MG/100ML; MG/100ML; MG/100ML; MG/100ML
INJECTION, SOLUTION INTRAVENOUS CONTINUOUS
Status: DISCONTINUED | OUTPATIENT
Start: 2022-10-26 | End: 2022-10-28

## 2022-10-26 RX ORDER — SCOLOPAMINE TRANSDERMAL SYSTEM 1 MG/1
1 PATCH, EXTENDED RELEASE TRANSDERMAL
Status: DISCONTINUED | OUTPATIENT
Start: 2022-10-26 | End: 2022-10-28 | Stop reason: HOSPADM

## 2022-10-26 RX ORDER — DIAZEPAM 5 MG/ML
5 INJECTION, SOLUTION INTRAMUSCULAR; INTRAVENOUS ONCE
Status: COMPLETED | OUTPATIENT
Start: 2022-10-26 | End: 2022-10-26

## 2022-10-26 RX ADMIN — HYDROMORPHONE HYDROCHLORIDE 1 MG: 1 INJECTION, SOLUTION INTRAMUSCULAR; INTRAVENOUS; SUBCUTANEOUS at 20:20

## 2022-10-26 RX ADMIN — METHOCARBAMOL TABLETS 1000 MG: 500 TABLET, COATED ORAL at 12:44

## 2022-10-26 RX ADMIN — SODIUM CHLORIDE, POTASSIUM CHLORIDE, SODIUM LACTATE AND CALCIUM CHLORIDE: 600; 310; 30; 20 INJECTION, SOLUTION INTRAVENOUS at 00:48

## 2022-10-26 RX ADMIN — HYDROMORPHONE HYDROCHLORIDE 1 MG: 1 INJECTION, SOLUTION INTRAMUSCULAR; INTRAVENOUS; SUBCUTANEOUS at 16:59

## 2022-10-26 RX ADMIN — HEPARIN SODIUM 5000 UNITS: 5000 INJECTION INTRAVENOUS; SUBCUTANEOUS at 12:44

## 2022-10-26 RX ADMIN — PROCHLORPERAZINE EDISYLATE 10 MG: 5 INJECTION, SOLUTION INTRAMUSCULAR; INTRAVENOUS at 20:04

## 2022-10-26 RX ADMIN — DIAZEPAM 5 MG: 5 INJECTION, SOLUTION INTRAMUSCULAR; INTRAVENOUS at 21:06

## 2022-10-26 RX ADMIN — PANTOPRAZOLE SODIUM 40 MG: 40 INJECTION, POWDER, LYOPHILIZED, FOR SOLUTION INTRAVENOUS at 12:43

## 2022-10-26 RX ADMIN — OXYCODONE 10 MG: 5 TABLET ORAL at 06:27

## 2022-10-26 RX ADMIN — FAMOTIDINE 20 MG: 20 TABLET ORAL at 08:32

## 2022-10-26 RX ADMIN — OXYCODONE 10 MG: 5 TABLET ORAL at 00:37

## 2022-10-26 RX ADMIN — HYDRALAZINE HYDROCHLORIDE 5 MG: 20 INJECTION INTRAMUSCULAR; INTRAVENOUS at 21:06

## 2022-10-26 RX ADMIN — CEFAZOLIN 2000 MG: 2 INJECTION, POWDER, FOR SOLUTION INTRAMUSCULAR; INTRAVENOUS at 02:38

## 2022-10-26 RX ADMIN — BUSPIRONE HYDROCHLORIDE 15 MG: 10 TABLET ORAL at 08:28

## 2022-10-26 RX ADMIN — HEPARIN SODIUM 5000 UNITS: 5000 INJECTION INTRAVENOUS; SUBCUTANEOUS at 06:27

## 2022-10-26 RX ADMIN — ACETAMINOPHEN 650 MG: 325 TABLET ORAL at 12:44

## 2022-10-26 RX ADMIN — ONDANSETRON 4 MG: 2 INJECTION INTRAMUSCULAR; INTRAVENOUS at 08:54

## 2022-10-26 RX ADMIN — HYDROMORPHONE HYDROCHLORIDE 1 MG: 1 INJECTION, SOLUTION INTRAMUSCULAR; INTRAVENOUS; SUBCUTANEOUS at 08:46

## 2022-10-26 RX ADMIN — POLYETHYLENE GLYCOL 3350 17 G: 17 POWDER, FOR SOLUTION ORAL at 08:32

## 2022-10-26 RX ADMIN — BUSPIRONE HYDROCHLORIDE 15 MG: 10 TABLET ORAL at 12:43

## 2022-10-26 RX ADMIN — ACETAMINOPHEN 650 MG: 325 TABLET ORAL at 06:27

## 2022-10-26 RX ADMIN — OXYCODONE 10 MG: 5 TABLET ORAL at 19:15

## 2022-10-26 RX ADMIN — OXYCODONE 10 MG: 5 TABLET ORAL at 14:22

## 2022-10-26 RX ADMIN — ACETAMINOPHEN 650 MG: 325 TABLET ORAL at 17:50

## 2022-10-26 RX ADMIN — HYDROMORPHONE HYDROCHLORIDE 1 MG: 1 INJECTION, SOLUTION INTRAMUSCULAR; INTRAVENOUS; SUBCUTANEOUS at 12:02

## 2022-10-26 RX ADMIN — METHOCARBAMOL TABLETS 1000 MG: 500 TABLET, COATED ORAL at 16:58

## 2022-10-26 RX ADMIN — DOCUSATE SODIUM 100 MG: 100 CAPSULE, LIQUID FILLED ORAL at 08:31

## 2022-10-26 RX ADMIN — CEFAZOLIN 2000 MG: 2 INJECTION, POWDER, FOR SOLUTION INTRAMUSCULAR; INTRAVENOUS at 17:54

## 2022-10-26 RX ADMIN — BUPROPION HYDROCHLORIDE 150 MG: 150 TABLET, FILM COATED, EXTENDED RELEASE ORAL at 08:27

## 2022-10-26 RX ADMIN — SODIUM CHLORIDE, POTASSIUM CHLORIDE, SODIUM LACTATE AND CALCIUM CHLORIDE: 600; 310; 30; 20 INJECTION, SOLUTION INTRAVENOUS at 10:01

## 2022-10-26 RX ADMIN — ALUMINUM HYDROXIDE, MAGNESIUM HYDROXIDE, AND SIMETHICONE 30 ML: 200; 200; 20 SUSPENSION ORAL at 15:23

## 2022-10-26 RX ADMIN — ALBUTEROL SULFATE 2.5 MG: 2.5 SOLUTION RESPIRATORY (INHALATION) at 20:50

## 2022-10-26 RX ADMIN — BUDESONIDE INHALATION SUSPENSION 500 MCG: 0.5 SUSPENSION RESPIRATORY (INHALATION) at 20:50

## 2022-10-26 RX ADMIN — CEFAZOLIN 2000 MG: 2 INJECTION, POWDER, FOR SOLUTION INTRAMUSCULAR; INTRAVENOUS at 10:29

## 2022-10-26 RX ADMIN — ALBUTEROL SULFATE 2.5 MG: 2.5 SOLUTION RESPIRATORY (INHALATION) at 13:57

## 2022-10-26 RX ADMIN — ARFORMOTEROL TARTRATE 15 MCG: 15 SOLUTION RESPIRATORY (INHALATION) at 20:50

## 2022-10-26 RX ADMIN — ACETAMINOPHEN 650 MG: 325 TABLET ORAL at 00:38

## 2022-10-26 RX ADMIN — ONDANSETRON 4 MG: 2 INJECTION INTRAMUSCULAR; INTRAVENOUS at 15:14

## 2022-10-26 RX ADMIN — METHOCARBAMOL TABLETS 1000 MG: 500 TABLET, COATED ORAL at 08:31

## 2022-10-26 RX ADMIN — HEPARIN SODIUM 5000 UNITS: 5000 INJECTION INTRAVENOUS; SUBCUTANEOUS at 20:09

## 2022-10-26 RX ADMIN — PROCHLORPERAZINE EDISYLATE 10 MG: 5 INJECTION, SOLUTION INTRAMUSCULAR; INTRAVENOUS at 12:11

## 2022-10-26 ASSESSMENT — PAIN DESCRIPTION - LOCATION
LOCATION: ABDOMEN
LOCATION: ABDOMEN;INCISION
LOCATION: ABDOMEN
LOCATION: ABDOMEN;INCISION
LOCATION: ABDOMEN
LOCATION: ABDOMEN;INCISION
LOCATION: ABDOMEN

## 2022-10-26 ASSESSMENT — PAIN DESCRIPTION - DESCRIPTORS
DESCRIPTORS: ACHING
DESCRIPTORS: BURNING
DESCRIPTORS: ACHING;BURNING
DESCRIPTORS: DULL;BURNING
DESCRIPTORS: SHARP
DESCRIPTORS: ACHING;BURNING
DESCRIPTORS: ACHING;BURNING
DESCRIPTORS: DULL;BURNING
DESCRIPTORS: BURNING
DESCRIPTORS: ACHING;BURNING
DESCRIPTORS: BURNING;ACHING
DESCRIPTORS: ACHING

## 2022-10-26 ASSESSMENT — PAIN DESCRIPTION - ORIENTATION
ORIENTATION: UPPER
ORIENTATION: MID
ORIENTATION: RIGHT;LEFT;MID
ORIENTATION: UPPER
ORIENTATION: RIGHT;LEFT;LOWER;MID
ORIENTATION: RIGHT;LEFT;MID;LOWER
ORIENTATION: LEFT;RIGHT;MID;LOWER
ORIENTATION: RIGHT;LEFT;MID
ORIENTATION: MID
ORIENTATION: RIGHT;LEFT;LOWER;MID
ORIENTATION: RIGHT;LEFT;MID
ORIENTATION: RIGHT;MID

## 2022-10-26 ASSESSMENT — PAIN - FUNCTIONAL ASSESSMENT

## 2022-10-26 ASSESSMENT — PAIN SCALES - GENERAL
PAINLEVEL_OUTOF10: 8
PAINLEVEL_OUTOF10: 7
PAINLEVEL_OUTOF10: 8
PAINLEVEL_OUTOF10: 8
PAINLEVEL_OUTOF10: 5
PAINLEVEL_OUTOF10: 0
PAINLEVEL_OUTOF10: 8
PAINLEVEL_OUTOF10: 7
PAINLEVEL_OUTOF10: 8
PAINLEVEL_OUTOF10: 6
PAINLEVEL_OUTOF10: 8

## 2022-10-26 ASSESSMENT — PAIN DESCRIPTION - PAIN TYPE
TYPE: SURGICAL PAIN
TYPE: SURGICAL PAIN

## 2022-10-26 ASSESSMENT — PAIN DESCRIPTION - FREQUENCY
FREQUENCY: CONTINUOUS
FREQUENCY: CONTINUOUS

## 2022-10-26 ASSESSMENT — PAIN DESCRIPTION - ONSET
ONSET: ON-GOING
ONSET: ON-GOING

## 2022-10-26 NOTE — OP NOTE
[place] [address] SURGERY     OPERATIVE DICTATION    NAME: Dalton Silverio   MRN: [id number]  DATE: 10/24/2022    AGE: 56 y.o. SURGEON: Mini Mckeon MD, MD Mer[place] MarinesGeorgia Galeazzi, DO     PREOPERATIVE DIAGNOSIS: Recurrent incarcerated incisional hernia, panniculitis  POSTOPERATIVE DIAGNOSIS: Same     OPERATION:  1. Open repair of recurrent incisional hernia with retrorectus mesh placement. 2. Removal of prior mesh. 3. Bilateral rectus myofascial release and bilateral posterior component  separation technique with bilateral transversus abdominis release. 4. Placement of 30 cm by 30 cm Phasix mesh    5. Panniculectomy. 6. Oefmm-tm-Twn abdominoplasty with trini-umbilical reconstruction    7. Application of Prevena incisional wound vacuum device    ANESTHESIA: General (45 min cosmetic time)    ESTIMATED BLOOD LOSS: 200 mL    SPECIMEN: 1) Mesh    2) Hernia sac     3) Skin and subcutaneous tissue (4.3 lbs)    OPERATIVE INDICATIONS: This is a 56 y.o. female who presented to the office in consultation for a multiply recurrent, incarcerated incisional hernia in the setting of panniculitis after massive weight loss. She has undergone sleeve gastrectomy converted to RYGB with symptoms of panniculitis. She additionally was noted to have perforation of her previous mesh repair (performed laparoscopically with metallic tacks). Options of reconstruction were discussed with the patient. She underwent prehabilitation to obtain weight stability. The plan of surgery, risks, benefits, alternatives, indications, limitations, possible complications, and future surgeries were discussed with the patient and she agreed to proceed. OPERATIVE PROCEDURE: The patient was marked in the preoperative holding area and then brought to the operating room and placed in the supine position on the operating room table.   After undergoing endotracheal anesthesia without difficulty, the patient was prepped and draped in the usual sterile manner. A time-out was performed confirming the patient and procedure to be performed. The abdomen was entered via a midline laparotomy in the lower aspect of the abdomen based on the CT imaging. Once all of the bowel had completely been freed from the abdominal wall, the previous mesh was subsequently dissected free from the peritoneum and sent as specimen. At that point, we started our left-sided rectus myofascial release. We dissected the posterior rectus sheath off the rectus muscle laterally to the termination of the rectus sheath. We then dissected inferiorly into the prevesical space and superiorly we dropped the posterior rectus sheath off of the linea alba above the most superior hernia defect. This was a left-sided rectus myofascial release allowing this rectus muscle to widen and to bring the rectus muscles to the midline. Once this was accomplished, we turned our attention to the contralateral right side to perform a right-sided rectus myofascial release. We dissected the posterior rectus sheath off the rectus muscle laterally to the termination of the rectus sheath. We then dissected inferiorly into the prevesical space and superiorly we dropped the posterior rectus sheath off of the linea alba above the most superior hernia defect. This was a right-sided rectus myofascial release allowing this rectus muscle to widen and to bring the rectus muscles to the midline. After the left & right sided rectus myofascial releases were performed, we noted that we were unable to approximate the linea alba in the midline without tension. We thus elected to perform a bilateral posterior component separation with transversus abdominis release.   Going back toward the left side, the fascia overlying the transversus abdominis muscle was incised allowing us to enter the plane between the transversus abdominis muscle and peritoneum  This was dissected superiorly and inferiorly to allow for mobilization of the posterior rectus sheath towards the midline. The dissection was performed as far out laterally as possible    Once this was completed, attention was then directed toward the contralateral right side where the same procedure was performed. The fascia overlying the transversus abdominis muscle was incised on the craniocaudal dimensions allowing for dissection of the transversalis muscle and peritoneum. This allowed for additional mobilization of the posterior rectus sheath to approximate the posterior rectus sheath for placement of the mesh. After performing these four myofascial releases, we were able to approximate the posterior rectus sheath using a running 0-Vicryl suture. At this point, the bowel was protected as the posterior sheath was entirely closed and counts were performed and correct. Dr. Josh Wiley then performed the hernia repair as will be dictated in a separate operative note. Once completed, a panniculectomy was performed by incising along the transverse lower abdominal markings with a 10 blade. The tissue was dissected superiorly to the previous markings to remove the excess skin due to her history of panniculitis. Once this was performed, the skin was excised in a wedge manner. The upper abdomen was then evaluated for contouring and umbilical reconstruction with her yeqcp-xy-Qgi component. A substantial amount of excess skin was marked for excision. As the umbilicus was to be included in the excision (and would not otherwise be viable based on the musculocutaneous flaps that were elevated) - two flaps - one on each side - were marked in a 2 x 2 cm manner. The tissue was excised with electrocautery. The flaps were then dissected to remove the sub-scarpal fat to decrease volume for the umbilical reconstruction. Two 15F drains were brought out through a separate stab incisions and secured in place using a 3-0 Nylon sutures.  After hemostasis was again checked to be satisfactory with Surgicel powder and VistaSeal, the incision was tailor tacked and then closed in layers using 3-0 Monocryl and Stratafix sutures. A Prevena incisional wound VAC was then placed. The VAC was then applied with excellent seal. The patient was then awakened and taken to PACU in satisfactory condition.      Tiffanie Villa MD

## 2022-10-26 NOTE — DISCHARGE INSTR - COC
Continuity of Care Form    Patient Name: David Dennis   :  1965  MRN:  [id number]    Admit date:  10/24/2022  Discharge date:  ***    Code Status Order: Full Code   Advance Directives:   [address] [facility] Documentation       Date/Time Healthcare Directive Type of Healthcare Directive Copy in [address] [facility] [address] Agent's Name Healthcare Agent's Phone Number    10/24/22 0600 Yes, patient has an advance directive for healthcare treatment Durable power of  for health care No, copy requested from other (See comment) Spouse -- --            Admitting Physician:  Nevin Maya MD  PCP: Anitha    Discharging Nurse: [facility] Unit/Room#: 5306/5306-01  Discharging Unit Phone Number: ***    Emergency Contact:   Extended Emergency Contact Information  Primary Emergency Contact: [address] Phone: [phone]  Mobile Phone: [phone]  Relation: Spouse  Preferred language: English  Secondary Emergency Contact: [address] [place] [address] Phone: [phone]  Mobile Phone: [phone]  Relation: Parent  Preferred language: English    Past Surgical History:  Past Surgical History:   Procedure Laterality Date    ABDOMINOPLASTY N/A 10/24/2022    [id number] ABDOMINOPLASTY performed by Nevin Maya MD at [address] [facility] Right     [address] Right     CYST REMOVAL Right     [address] Bilateral     middle finger    GALLBLADDER SURGERY  2017    [address] Right 2007    [address]  2008    LIPECTOMY N/A 10/24/2022    PANNICULECTOMY; performed by Nevin Maya MD at [address] [facility]   [address] N/A 10/24/2022    OPEN, RECURRENT, INCARCERATED, INCISIONAL HERNIA REPAIR WITH BILATERAL POSTERIOR COMPONENT SEPARATION, REPAIR OF INTERNAL HERNIA WITH CLOSURE OF MESENTARY DEFECT, BILATERAL, TRANSVERSUS ABDOMNIS RELEASE performed by Steven Sr DO at [facility] OR       Immunization History: There is no immunization history on file for this patient. Active Problems:  Patient Active Problem List   Diagnosis Code    Recurrent incisional hernia K43.2    Internal hernia K45.8    Incisional hernia of anterior abdominal wall without obstruction or gangrene K43.2    Elective procedure for unacceptable cosmetic appearance Z41.1    Panniculitis M79.3       Isolation/Infection:   Isolation            No Isolation          Patient Infection Status       None to display            Nurse Assessment:  Last Vital Signs: /76   Pulse 80   Temp 97.2 °F (36.2 °C) (Oral)   Resp 16   Ht 4' 11\" (1.499 m)   Wt 202 lb 2.6 oz (91.7 kg)   SpO2 95%   BMI 40.83 kg/m²     Last documented pain score (0-10 scale): Pain Level: 8  Last Weight:   Wt Readings from Last 1 Encounters:   10/26/22 202 lb 2.6 oz (91.7 kg)     Mental Status:  {IP PT MENTAL STATUS:}    IV Access:  { ANTONIO IV ACCESS:049094460}    Nursing Mobility/ADLs:  Walking   {CHP DME RZCB:265824186}  Transfer  {CHP DME NQLL:443634274}  Bathing  {CHP DME NKWQ:695105087}  Dressing  {CHP DME JACKI:915753161}  Toileting  {CHP DME TYOX:538974596}  Feeding  {CHP DME PAF}  Med Admin  {CHP DME IWPJ:974102799}  Med Delivery   { ANTONIO MED Delivery:729816948}    Wound Care Documentation and Therapy:  Incision 10/24/22 Abdomen Medial;Upper (Active)   Dressing Status Clean;Dry; Intact 10/26/22 0810   Dressing/Treatment Negative pressure wound therapy 10/26/22 0810   Incision Assessment Other (Comment) 10/25/22 2120   Number of days: 2        Elimination:  Continence:    Bowel: {YES / ZH:55455}  Bladder: {YES / OX:28519}  Urinary Catheter: {Urinary Catheter:187219545}   Colostomy/Ileostomy/Ileal Conduit: {YES / KS:70096}       Date of Last BM: ***    Intake/Output Summary (Last 24 hours) at 10/26/2022 1041  Last data filed at 10/26/2022 0432  Gross per 24 hour   Intake 4166 ml   Output 1940 ml   Net 2226 ml     I/O last 3 completed shifts: In: 5285 [P.O.:1040;  I.V.:4245]  Out: 2685 [Urine:1785; Drains:900]    Safety Concerns:     [address] [facility] Safety Concerns:297364362}    Impairments/Disabilities:      [address] [facility] Impairments/Disabilities:235511405}    Nutrition Therapy:  Current Nutrition Therapy:   [address] [facility] Diet List:649076753}    Routes of Feeding: {CHP DME Other Feedings:545952929}  Liquids: {Slp liquid thickness:69359}  Daily Fluid Restriction: {CHP DME Yes amt example:614463274}  Last Modified Barium Swallow with Video (Video Swallowing Test): {Done Not Done LMYO:128280253}    Treatments at the Time of Hospital Discharge:   Respiratory Treatments: ***  Oxygen Therapy:  {Therapy; copd oxygen:06895}  Ventilator:    {MH CC Vent TYKD:367876591}    Rehab Therapies: {THERAPEUTIC INTERVENTION:1110464091}  Weight Bearing Status/Restrictions: [address] [facility] Weight Bearin}  Other Medical Equipment (for information only, NOT a DME order):  {EQUIPMENT:753475561}  Other Treatments: ***    Patient's personal belongings (please select all that are sent with patient):  {[facility] DME Belongings:863675411}    RN SIGNATURE:  {Esignature:899913004}    CASE MANAGEMENT/SOCIAL WORK SECTION    Inpatient Status Date: ***    Readmission Risk Assessment Score:  Readmission Risk              Risk of Unplanned Readmission:  15           Discharging to Facility/ Agency   Name:   Address:  Phone:  Fax:    Dialysis Facility (if applicable)   Name:  Address:  Dialysis Schedule:  Phone:  Fax:    / signature: {Esignature:608495775}    PHYSICIAN SECTION    Prognosis: {Prognosis:8598400356}    Condition at Discharge: [address] Patient Condition:513121889}    Rehab Potential (if transferring to Rehab): {Prognosis:0099453178}    Recommended Labs or Other Treatments After Discharge: ***    Physician Certification: I certify the above information and transfer of Dalton Silverio  is necessary for the continuing treatment of the diagnosis listed and that she requires {Admit to Appropriate Level of Care:44991} for {GREATER/LESS:860040694} 30 days.      Update Admission H&P: {CHP DME Changes in [facility]:312395604}    PHYSICIAN SIGNATURE:  {Esignature:389638363}

## 2022-10-26 NOTE — PROGRESS NOTES
Patient continues with nausea and small emesis including undigested food. Zofran given. Messaged Dr Ria Nazario. Will monitor.

## 2022-10-26 NOTE — PROGRESS NOTES
Plastic Surgery   Daily Progress Note  Patient: Marylee Gavel      CC: Incarcerated midline incisional hernia, panniculitis     SUBJECTIVE:   Afebrile, HDS. Pain improved, denies nausea or vomiting. Tolerating regular diet. Urine output increased, now adequate. OOB ambulating without difficulty     ROS:   A 14 point review of systems was conducted, significant findings as noted above. All other systems negative. OBJECTIVE:    PHYSICAL EXAM:    Vitals:    10/26/22 0037 10/26/22 0320 10/26/22 0555 10/26/22 0600   BP:  126/62     Pulse:  78     Resp: 19 18     Temp:  97.9 °F (36.6 °C)     TempSrc:  Oral     SpO2:  95%     Weight:   195 lb 8.8 oz (88.7 kg) 202 lb 2.6 oz (91.7 kg)   Height:           General appearance: alert, no acute distress, grooming appropriate  Eyes: No scleral icterus, EOM grossly intact  Neck: trachea midline, no JVD  Chest/Lungs: Normal effort with no accessory muscle use on RA  Cardiovascular: RRR  Abdomen: Appropriately-tender, incision c/d/i and well approximated with Prevena Plus with wound vac with good suction. Retrorectus drain RUQ, RLQ drain site with skin tear. B/L lower abdominal drains, all draining serosanguinous fluid  Skin: warm and dry, no rashes  Extremities: no edema, no cyanosis  Genitourinary: Lo in place with clear yellow urine this morning  Neuro: A&Ox3, no focal deficits, sensation intact    LABS:   Recent Labs     10/25/22  0510   WBC 8.1   HGB 9.0*   HCT 27.6*   MCV 95.7             Recent Labs     10/25/22  0510 10/25/22  1507   * 133*   K 4.0 4.2   CL 99 100   CO2 25 19*   PHOS 4.3 3.8   BUN 18 17   CREATININE 2.0* 1.9*        No results for input(s): AST, ALT, ALB, BILIDIR, BILITOT, ALKPHOS in the last 72 hours. No results for input(s): LIPASE, AMYLASE in the last 72 hours. No results for input(s): PROT, INR, APTT in the last 72 hours. No results for input(s): CKTOTAL, CKMB, CKMBINDEX, TROPONINI in the last 72 hours.       ASSESSMENT & PLAN: This is a 56 y.o. female s/p open midline incisional hernia repair, internal hernia repair with closure of mesentary defect, bilateral transversus abdominus release, panniculectomy and Ummlq-qu-Trh abdominoplasty on 10/24/22 2/2 recurrent, incarcerated incisional hernia and panniculitis, POD#2.     - Follow up AM creatinine   - Possible padilla removal today and decreased IV fluid rate, pending AM labs   - Continue robaxin   - Continue IS, x10/hr. Encourage deep breathing  - Encourage OOB and ambulation in beach chair position at all times. - Continue general diet   - drain teaching prior to discharge   - continue Kamryn Sharma MD  PGY1, General Surgery  10/26/22  6:15 AM  [phone]    I saw and independently examined the patient today. I agree with the history of present illness, past medical/surgical histories, family history, social history, medication list and allergies as listed. The review of systems is as noted above. My physical exam confirms the findings listed above. Review of labs, pathology reports, radiology reports and medical records confirm the findings noted above. I edited the note where appropriate in italics, strikethrough font, or underline. Pain control has been satisfactory. Vac intact with good seal - has some pain from pulling from the plastic. Drains serosang. Awaiting lab results - if satisfactory and if UOP appropriately, will DC valerie today.     Ashlee Mcleod MD  [address] Reconstructive Surgery  [phone]  10/26/22

## 2022-10-26 NOTE — PROGRESS NOTES
Plastic Surgery   Daily Progress Note  Patient: Jose Arellano      CC: Incarcerated midline incisional hernia, panniculitis     SUBJECTIVE:   No acute events overnight. OOB ambulating in hunched position. Complains burning sensation in RLQ incision. Denies flatus or bowel movement. Lo in place. Urban diet. Denies n/v    ROS:   A 14 point review of systems was conducted, significant findings as noted above. All other systems negative. OBJECTIVE:    PHYSICAL EXAM:    Vitals:    10/26/22 0037 10/26/22 0320 10/26/22 0555 10/26/22 0600   BP:  126/62     Pulse:  78     Resp: 19 18     Temp:  97.9 °F (36.6 °C)     TempSrc:  Oral     SpO2:  95%     Weight:   195 lb 8.8 oz (88.7 kg) 202 lb 2.6 oz (91.7 kg)   Height:           General appearance: alert, no acute distress, grooming appropriate  Eyes: No scleral icterus, EOM grossly intact  Neck: trachea midline, no JVD  Chest/Lungs: Normal effort with no accessory muscle use on RA  Cardiovascular: RRR  Abdomen: Appropriately-tender, incision c/d/i and well with wound vac. Retrorectus drain RUQ, B/L lower abdominal drains, all draining serosanguinous fluid, small skin tear above rectorectus drain noted  Skin: warm and dry, no rashes  Extremities: no edema, no cyanosis  Genitourinary: Lo in place with clear yellow urine  Neuro: A&Ox3, no focal deficits, sensation intact    LABS:   Recent Labs     10/25/22  0510   WBC 8.1   HGB 9.0*   HCT 27.6*   MCV 95.7             Recent Labs     10/25/22  0510 10/25/22  1507   * 133*   K 4.0 4.2   CL 99 100   CO2 25 19*   PHOS 4.3 3.8   BUN 18 17   CREATININE 2.0* 1.9*        No results for input(s): AST, ALT, ALB, BILIDIR, BILITOT, ALKPHOS in the last 72 hours. No results for input(s): LIPASE, AMYLASE in the last 72 hours. No results for input(s): PROT, INR, APTT in the last 72 hours. No results for input(s): CKTOTAL, CKMB, CKMBINDEX, TROPONINI in the last 72 hours. ASSESSMENT & PLAN:   This is a 56 y. o. female s/p open midline incisional hernia repair, internal hernia repair with closure of mesentary defect, bilateral transversus abdominus release, panniculectomy and Hoaoa-[id number]-Dvk abdominoplasty on 10/24/22 2/2 recurrent, incarcerated incisional hernia and panniculitis, POD#2.     - add bowel reg  - cont fluid due to BRUCE, F/U labs  - Continue IS; Encourage deep breathing  - Encourage OOB and ambulation in beach chair position at all times.    - Continue general diet  - drain teaching prior to discharge   - continue Ancef per plastic surgery  - Possible d/c In 1-2 days       Zeferino Serrano CNP  10/26/2022  6:13 AM  zachariah

## 2022-10-26 NOTE — PROGRESS NOTES
Patient O&A, VSS, pain controlled with oxycodone 10 mg, dilaudid 1 mg with benefit. Patient had several episodes of emesis, total 100-120 ml, even PRN, IV zofran 4 mg was given patient has no benefit ; Issue discussed with NP, Joan Almanza, compdav and IV protonix for heartburn prescribed, given to the patient again without fixing the problem, patient continued to have nausea and vomiting. Dr. Jd Anna informed, prescribed scopolamine transdermal patch. Patient had one episode of vomiting after medication was applied. IV antibiotics administered as prescribed. Patient was walk on the khalil. I&O recorded.

## 2022-10-26 NOTE — PLAN OF CARE
Problem: Pain  Goal: Verbalizes/displays adequate comfort level or baseline comfort level  Note: Pain controlled with Tylenol 650 mg; HA pain increased by the end of the shift from 7/10 to 10/10. Educated to take medications before pain become unbearable. Problem: Safety - Adult  Goal: Free from fall injury  Note: Bed/chair alarm used to prevent fall.  Patient elevated 90 degree before and after the meal.

## 2022-10-26 NOTE — PLAN OF CARE
Problem: Discharge Planning  Goal: Discharge to home or other facility with appropriate resources  10/25/2022 2335 by Sterling Power RN  Outcome: Progressing  10/25/2022 1603 by Mayda Arriaza RN  Outcome: Progressing     Problem: Pain  Goal: Verbalizes/displays adequate comfort level or baseline comfort level  10/25/2022 2335 by Sterling Power RN  Outcome: Progressing  10/25/2022 1603 by Mayda Arriaza RN  Outcome: Progressing  Flowsheets (Taken 10/25/2022 1603)  Verbalizes/displays adequate comfort level or baseline comfort level:   Encourage patient to monitor pain and request assistance   Assess pain using appropriate pain scale   Administer analgesics based on type and severity of pain and evaluate response  Note: Patient continues with surgical abdominal pain, but controlled with routine robaxin and oxycodone given prn. Pillow used to splint during coughing. Will continue to monitor. Problem: Safety - Adult  Goal: Free from fall injury  10/25/2022 2335 by Sterling Power RN  Outcome: Progressing  10/25/2022 1603 by Mayda Arriaza RN  Flowsheets (Taken 10/25/2022 1603)  Free From Fall Injury: Instruct family/caregiver on patient safety  Note: Patient up with assist to manage equipment. Gait steady, using walker to stay in hunched position. Patient calls appropriately for assistance. Bed locked in lower position. Call light kept in reach.        Problem: ABCDS Injury Assessment  Goal: Absence of physical injury  Outcome: Progressing

## 2022-10-26 NOTE — PROGRESS NOTES
Patient continues with nausea and emesis after prn medications given. Discussed with Dr Raul Reilly, she said she'll add something else. Will monitor.

## 2022-10-26 NOTE — PROGRESS NOTES
Patient alert and oriented. Currently on beach chair position. With stable VS. Complaints of constant pain on abdominal area. Tylenol, oxycodone and flexiril given as needed. Zofran was given for episodes of nausea. Drained 3 wound vac routinely. With padilla. Abd dressing clean, dry and intact. Meds given as scheduled. Needs attended. Handed off.

## 2022-10-27 LAB
ALBUMIN SERPL-MCNC: 2.8 G/DL (ref 3.4–5)
ANION GAP SERPL CALCULATED.3IONS-SCNC: 10 MMOL/L (ref 3–16)
BASOPHILS ABSOLUTE: 0 K/UL (ref 0–0.2)
BASOPHILS RELATIVE PERCENT: 0.2 %
BUN BLDV-MCNC: 12 MG/DL (ref 7–20)
CALCIUM SERPL-MCNC: 8.2 MG/DL (ref 8.3–10.6)
CHLORIDE BLD-SCNC: 103 MMOL/L (ref 99–110)
CO2: 26 MMOL/L (ref 21–32)
CREAT SERPL-MCNC: 1.3 MG/DL (ref 0.6–1.1)
EOSINOPHILS ABSOLUTE: 0 K/UL (ref 0–0.6)
EOSINOPHILS RELATIVE PERCENT: 0.1 %
GFR SERPL CREATININE-BSD FRML MDRD: 48 ML/MIN/{1.73_M2}
GLUCOSE BLD-MCNC: 177 MG/DL (ref 70–99)
HCT VFR BLD CALC: 27.4 % (ref 36–48)
HEMOGLOBIN: 9.1 G/DL (ref 12–16)
LYMPHOCYTES ABSOLUTE: 0.4 K/UL (ref 1–5.1)
LYMPHOCYTES RELATIVE PERCENT: 5.7 %
MAGNESIUM: 1.9 MG/DL (ref 1.8–2.4)
MCH RBC QN AUTO: 31.6 PG (ref 26–34)
MCHC RBC AUTO-ENTMCNC: 33.4 G/DL (ref 31–36)
MCV RBC AUTO: 94.5 FL (ref 80–100)
MONOCYTES ABSOLUTE: 0.8 K/UL (ref 0–1.3)
MONOCYTES RELATIVE PERCENT: 10.4 %
NEUTROPHILS ABSOLUTE: 6.3 K/UL (ref 1.7–7.7)
NEUTROPHILS RELATIVE PERCENT: 83.6 %
PDW BLD-RTO: 13.9 % (ref 12.4–15.4)
PHOSPHORUS: 3.4 MG/DL (ref 2.5–4.9)
PLATELET # BLD: 216 K/UL (ref 135–450)
PMV BLD AUTO: 8 FL (ref 5–10.5)
POTASSIUM SERPL-SCNC: 4.4 MMOL/L (ref 3.5–5.1)
RBC # BLD: 2.89 M/UL (ref 4–5.2)
SODIUM BLD-SCNC: 139 MMOL/L (ref 136–145)
WBC # BLD: 7.6 K/UL (ref 4–11)

## 2022-10-27 PROCEDURE — 94640 AIRWAY INHALATION TREATMENT: CPT

## 2022-10-27 PROCEDURE — 6360000002 HC RX W HCPCS: Performed by: SURGERY

## 2022-10-27 PROCEDURE — 6360000002 HC RX W HCPCS

## 2022-10-27 PROCEDURE — 1200000000 HC SEMI PRIVATE

## 2022-10-27 PROCEDURE — 94761 N-INVAS EAR/PLS OXIMETRY MLT: CPT

## 2022-10-27 PROCEDURE — 6360000002 HC RX W HCPCS: Performed by: STUDENT IN AN ORGANIZED HEALTH CARE EDUCATION/TRAINING PROGRAM

## 2022-10-27 PROCEDURE — 6370000000 HC RX 637 (ALT 250 FOR IP): Performed by: STUDENT IN AN ORGANIZED HEALTH CARE EDUCATION/TRAINING PROGRAM

## 2022-10-27 PROCEDURE — 85025 COMPLETE CBC W/AUTO DIFF WBC: CPT

## 2022-10-27 PROCEDURE — 6360000002 HC RX W HCPCS: Performed by: NURSE PRACTITIONER

## 2022-10-27 PROCEDURE — 2580000003 HC RX 258: Performed by: STUDENT IN AN ORGANIZED HEALTH CARE EDUCATION/TRAINING PROGRAM

## 2022-10-27 PROCEDURE — 36415 COLL VENOUS BLD VENIPUNCTURE: CPT

## 2022-10-27 PROCEDURE — C9113 INJ PANTOPRAZOLE SODIUM, VIA: HCPCS | Performed by: NURSE PRACTITIONER

## 2022-10-27 PROCEDURE — 2580000003 HC RX 258: Performed by: SURGERY

## 2022-10-27 PROCEDURE — 80069 RENAL FUNCTION PANEL: CPT

## 2022-10-27 PROCEDURE — 83735 ASSAY OF MAGNESIUM: CPT

## 2022-10-27 PROCEDURE — 6370000000 HC RX 637 (ALT 250 FOR IP): Performed by: SURGERY

## 2022-10-27 RX ORDER — DIAZEPAM 5 MG/1
5 TABLET ORAL EVERY 6 HOURS PRN
Status: DISCONTINUED | OUTPATIENT
Start: 2022-10-27 | End: 2022-10-27

## 2022-10-27 RX ORDER — FAMOTIDINE 20 MG/1
20 TABLET, FILM COATED ORAL 2 TIMES DAILY
Status: DISCONTINUED | OUTPATIENT
Start: 2022-10-27 | End: 2022-10-28 | Stop reason: HOSPADM

## 2022-10-27 RX ORDER — DIAZEPAM 5 MG/1
5 TABLET ORAL EVERY 6 HOURS PRN
Status: DISCONTINUED | OUTPATIENT
Start: 2022-10-27 | End: 2022-10-28 | Stop reason: HOSPADM

## 2022-10-27 RX ORDER — MAGNESIUM SULFATE IN WATER 40 MG/ML
2000 INJECTION, SOLUTION INTRAVENOUS ONCE
Status: COMPLETED | OUTPATIENT
Start: 2022-10-27 | End: 2022-10-27

## 2022-10-27 RX ORDER — HYDRALAZINE HYDROCHLORIDE 20 MG/ML
10 INJECTION INTRAMUSCULAR; INTRAVENOUS EVERY 6 HOURS PRN
Status: DISCONTINUED | OUTPATIENT
Start: 2022-10-27 | End: 2022-10-28 | Stop reason: HOSPADM

## 2022-10-27 RX ORDER — BUDESONIDE 0.25 MG/2ML
INHALANT ORAL
Status: COMPLETED
Start: 2022-10-27 | End: 2022-10-27

## 2022-10-27 RX ADMIN — CEFAZOLIN 2000 MG: 2 INJECTION, POWDER, FOR SOLUTION INTRAMUSCULAR; INTRAVENOUS at 03:40

## 2022-10-27 RX ADMIN — PANTOPRAZOLE SODIUM 40 MG: 40 INJECTION, POWDER, LYOPHILIZED, FOR SOLUTION INTRAVENOUS at 08:03

## 2022-10-27 RX ADMIN — FAMOTIDINE 20 MG: 20 TABLET ORAL at 08:04

## 2022-10-27 RX ADMIN — ARFORMOTEROL TARTRATE 15 MCG: 15 SOLUTION RESPIRATORY (INHALATION) at 12:01

## 2022-10-27 RX ADMIN — MAGNESIUM SULFATE HEPTAHYDRATE 2000 MG: 40 INJECTION, SOLUTION INTRAVENOUS at 08:57

## 2022-10-27 RX ADMIN — HYDROMORPHONE HYDROCHLORIDE 1 MG: 1 INJECTION, SOLUTION INTRAMUSCULAR; INTRAVENOUS; SUBCUTANEOUS at 10:18

## 2022-10-27 RX ADMIN — ACETAMINOPHEN 650 MG: 325 TABLET ORAL at 18:34

## 2022-10-27 RX ADMIN — HYDRALAZINE HYDROCHLORIDE 5 MG: 20 INJECTION INTRAMUSCULAR; INTRAVENOUS at 03:37

## 2022-10-27 RX ADMIN — HEPARIN SODIUM 5000 UNITS: 5000 INJECTION INTRAVENOUS; SUBCUTANEOUS at 05:57

## 2022-10-27 RX ADMIN — ONDANSETRON 4 MG: 4 TABLET, ORALLY DISINTEGRATING ORAL at 18:35

## 2022-10-27 RX ADMIN — HEPARIN SODIUM 5000 UNITS: 5000 INJECTION INTRAVENOUS; SUBCUTANEOUS at 22:12

## 2022-10-27 RX ADMIN — CEFAZOLIN 2000 MG: 2 INJECTION, POWDER, FOR SOLUTION INTRAMUSCULAR; INTRAVENOUS at 19:55

## 2022-10-27 RX ADMIN — CEFAZOLIN 2000 MG: 2 INJECTION, POWDER, FOR SOLUTION INTRAMUSCULAR; INTRAVENOUS at 10:55

## 2022-10-27 RX ADMIN — DOCUSATE SODIUM 100 MG: 100 CAPSULE, LIQUID FILLED ORAL at 20:12

## 2022-10-27 RX ADMIN — ONDANSETRON 4 MG: 2 INJECTION INTRAMUSCULAR; INTRAVENOUS at 00:49

## 2022-10-27 RX ADMIN — BUDESONIDE INHALATION SUSPENSION 500 MCG: 0.5 SUSPENSION RESPIRATORY (INHALATION) at 20:32

## 2022-10-27 RX ADMIN — OXYCODONE 10 MG: 5 TABLET ORAL at 18:34

## 2022-10-27 RX ADMIN — SODIUM CHLORIDE, PRESERVATIVE FREE 10 ML: 5 INJECTION INTRAVENOUS at 08:04

## 2022-10-27 RX ADMIN — ONDANSETRON 4 MG: 2 INJECTION INTRAMUSCULAR; INTRAVENOUS at 08:03

## 2022-10-27 RX ADMIN — ALBUTEROL SULFATE 2.5 MG: 2.5 SOLUTION RESPIRATORY (INHALATION) at 20:32

## 2022-10-27 RX ADMIN — BUSPIRONE HYDROCHLORIDE 15 MG: 10 TABLET ORAL at 20:07

## 2022-10-27 RX ADMIN — ALBUTEROL SULFATE 2.5 MG: 2.5 SOLUTION RESPIRATORY (INHALATION) at 12:01

## 2022-10-27 RX ADMIN — SODIUM CHLORIDE, PRESERVATIVE FREE 10 ML: 5 INJECTION INTRAVENOUS at 20:06

## 2022-10-27 RX ADMIN — ACETAMINOPHEN 650 MG: 325 TABLET ORAL at 05:57

## 2022-10-27 RX ADMIN — SODIUM CHLORIDE, POTASSIUM CHLORIDE, SODIUM LACTATE AND CALCIUM CHLORIDE: 600; 310; 30; 20 INJECTION, SOLUTION INTRAVENOUS at 19:59

## 2022-10-27 RX ADMIN — BUDESONIDE INHALATION SUSPENSION 500 MCG: 0.5 SUSPENSION RESPIRATORY (INHALATION) at 12:03

## 2022-10-27 RX ADMIN — BUSPIRONE HYDROCHLORIDE 15 MG: 10 TABLET ORAL at 08:04

## 2022-10-27 RX ADMIN — BUPROPION HYDROCHLORIDE 150 MG: 150 TABLET, FILM COATED, EXTENDED RELEASE ORAL at 08:04

## 2022-10-27 RX ADMIN — FAMOTIDINE 20 MG: 20 TABLET ORAL at 20:13

## 2022-10-27 RX ADMIN — PROCHLORPERAZINE EDISYLATE 10 MG: 5 INJECTION, SOLUTION INTRAMUSCULAR; INTRAVENOUS at 03:38

## 2022-10-27 RX ADMIN — HEPARIN SODIUM 5000 UNITS: 5000 INJECTION INTRAVENOUS; SUBCUTANEOUS at 13:21

## 2022-10-27 RX ADMIN — ALBUTEROL SULFATE 2.5 MG: 2.5 SOLUTION RESPIRATORY (INHALATION) at 06:22

## 2022-10-27 RX ADMIN — OXYCODONE 10 MG: 5 TABLET ORAL at 08:04

## 2022-10-27 RX ADMIN — DOCUSATE SODIUM 100 MG: 100 CAPSULE, LIQUID FILLED ORAL at 08:04

## 2022-10-27 RX ADMIN — ACETAMINOPHEN 650 MG: 325 TABLET ORAL at 13:21

## 2022-10-27 RX ADMIN — BUDESONIDE 250 MCG: 0.25 SUSPENSION RESPIRATORY (INHALATION) at 12:01

## 2022-10-27 RX ADMIN — BUSPIRONE HYDROCHLORIDE 15 MG: 10 TABLET ORAL at 13:21

## 2022-10-27 RX ADMIN — HYDROMORPHONE HYDROCHLORIDE 0.5 MG: 1 INJECTION, SOLUTION INTRAMUSCULAR; INTRAVENOUS; SUBCUTANEOUS at 22:09

## 2022-10-27 RX ADMIN — ARFORMOTEROL TARTRATE 15 MCG: 15 SOLUTION RESPIRATORY (INHALATION) at 20:32

## 2022-10-27 RX ADMIN — HYDROMORPHONE HYDROCHLORIDE 1 MG: 1 INJECTION, SOLUTION INTRAMUSCULAR; INTRAVENOUS; SUBCUTANEOUS at 03:37

## 2022-10-27 ASSESSMENT — PAIN SCALES - GENERAL
PAINLEVEL_OUTOF10: 8
PAINLEVEL_OUTOF10: 5
PAINLEVEL_OUTOF10: 7
PAINLEVEL_OUTOF10: 7
PAINLEVEL_OUTOF10: 8
PAINLEVEL_OUTOF10: 8

## 2022-10-27 ASSESSMENT — PAIN DESCRIPTION - LOCATION
LOCATION: ABDOMEN
LOCATION: ABDOMEN;BACK
LOCATION: ABDOMEN

## 2022-10-27 NOTE — PROGRESS NOTES
Pharmacy Note - Renal Dosing    Pt is on Famotidine 20mg po daily - dose originally reduced due to BRUCE. Per [facility] Renal Dose Adjustment Policy, famotidine dose will be changed to 20 mg po BID - as CrCl is now > 50mL/min. Estimated Creatinine Clearance: Estimated Creatinine Clearance: 51 mL/min (A) (based on SCr of 1.3 mg/dL (H)). Dialysis Status, BRUCE, CKD: BRUCE (now resolved)  BMI: Body mass index is 40.83 kg/m². Rationale for Adjustment: Agent is renally eliminated. Pharmacy will continue to monitor renal function and adjust dose as necessary.       Please call with questions--  Thanks--  Ronnie Brown, PharmD, [facility], [address] ([facility])   10/27/2022 2:34 PM

## 2022-10-27 NOTE — PROGRESS NOTES
Plastic Surgery   Daily Progress Note  Patient: David Dennis    CC: Incarcerated midline incisional hernia, panniculitis     SUBJECTIVE:   Afebrile, HDS. Overnight patient c/o pain and nausea. Had relief with valium. Had small episodes of emesis overnight Given hydralazine overnight for htn    ROS:   A 14 point review of systems was conducted, significant findings as noted above. All other systems negative. OBJECTIVE:  PHYSICAL EXAM:  Vitals:    10/26/22 2009 10/26/22 2052 10/26/22 2325 10/27/22 0334   BP: (!) 187/99  136/64 (!) 170/72   Pulse:  76 84 98   Resp:  20 18 18   Temp:   98.8 °F (37.1 °C) 99.9 °F (37.7 °C)   TempSrc:   Oral Oral   SpO2:  95% 94% 93%   Weight:       Height:         General appearance: alert, no acute distress, grooming appropriate  Neck: trachea midline  Chest/Lungs: Normal effort with no accessory muscle use on RA. Non-productive cough  Cardiovascular: RRR  Abdomen: Appropriately-tender, incision c/d/i and well approximated with Prevena Plus with wound vac with good suction. Retrorectus drain RUQ, RLQ drain site with skin tear, dry. B/L lower abdominal drains, all draining serosanguinous fluid total 818cc/24 hours  Skin: warm and dry, no rashes  Extremities: no edema, no cyanosis  Neuro: A&Ox3    LABS:   Recent Labs     10/25/22  0510 10/26/22  0630   WBC 8.1 6.4   HGB 9.0* 7.9*   HCT 27.6* 23.6*   MCV 95.7 95.1    171          Recent Labs     10/25/22  1507 10/26/22  0629   * 134*   K 4.2 3.7    102   CO2 19* 25   PHOS 3.8 3.4   BUN 17 14   CREATININE 1.9* 1.5*        No results for input(s): AST, ALT, ALB, BILIDIR, BILITOT, ALKPHOS in the last 72 hours. No results for input(s): LIPASE, AMYLASE in the last 72 hours. No results for input(s): PROT, INR, APTT in the last 72 hours. No results for input(s): CKTOTAL, CKMB, CKMBINDEX, TROPONINI in the last 72 hours.       ASSESSMENT & PLAN:   This is a 56 y.o. female s/p open midline incisional hernia repair, internal hernia repair with closure of mesentary defect, bilateral transversus abdominus release, panniculectomy and Ssniy-gw-Cwj abdominoplasty on 10/24/22     - Follow up AM labs including creat  - continue Ancef   - Dc robaxin, start valium  - Continue IS, x10/hr. Encourage deep breathing  - Encourage OOB and ambulation in beach chair position at all times.    - patient with n/v will downgrade to clears, Await ROBF  - drain teaching prior to discharge   -  following for [facility] needs   - Dispo: 1-2 days pending ROBF, pain control       AVNI Luis CNP 10/27/2022 6:09 AM  Available via Perfect Serve [phone] Mon-Thurs this week

## 2022-10-27 NOTE — PROGRESS NOTES
Patient A&OX4. VSS. Patient has reported some pain rated 8/10 in abdomen and radiating into lower back. Patient has also reported some nausea, PRN zofran and compazine have been given with benefit. Tolerating minimal PO intake at this time. Voiding appropriately. Wound VAC dressing remains CDI with proper seal. Surgical sites remain CDI with abdominal binder and foam in place. ROGER drains are also intact with adequate output noted. Intermittent IVABX infusions per order. All patient care needs have been met at this time. Patient has been able to ambulate independently within room with no issues. Bedside table and call light are within reach if needed. Will continue to monitor.      Electronically signed by Dayton Novoa RN on 10/27/2022 at 11:46 AM 2. The status of comorbities. (See ED/admit documents)

## 2022-10-27 NOTE — PROGRESS NOTES
Pt alert and oriented. VSS with exception to increased BP at beginning of shift. Pt feeling very nauseous and reporting lots of pain. Surgical resident [facility] [place] notified via perfect serve. PRN hydralazine added and given with benefit, see MAR. Pt was given a one time dose of IV Valium for spasms, see MAR. Pt sleeping with respirations easy on recheck. Pt JPs with moderate output, see flowsheet. Pt surgical site CDI with wound vac. Pt resting comfortably in bed. Pt voiding. Pt has IVF infusing per orders. Pt has call light within reach, bed in lowest position with wheels locked, 2/4 side rails up, and bed alarm on. Will continue to monitor.

## 2022-10-27 NOTE — PROGRESS NOTES
Bariatric Surgery   Daily Progress Note  Patient: Dalia Borjas      CC: Incarcerated midline incisional hernia, panniculitis     SUBJECTIVE:   Afebrile, HDS. Overnight patient c/o pain and nausea. Had relief with valium. Had small episodes of emesis overnight with po intake. Given hydralazine overnight for HTN. Voiding freely after Lo removal. No BM or passing gas yet. ROS:   A 14 point review of systems was conducted, significant findings as noted above. All other systems negative. OBJECTIVE:    PHYSICAL EXAM:    Vitals:    10/26/22 2009 10/26/22 2052 10/26/22 2325 10/27/22 0334   BP: (!) 187/99  136/64 (!) 170/72   Pulse:  76 84 98   Resp:  20 18 18   Temp:   98.8 °F (37.1 °C) 99.9 °F (37.7 °C)   TempSrc:   Oral Oral   SpO2:  95% 94% 93%   Weight:       Height:           General appearance: Alert, no acute distress, grooming appropriate  Eyes: No scleral icterus, EOM grossly intact  Neck: Trachea midline, no JVD  Chest/Lungs: Normal effort with no accessory muscle use on RA  Cardiovascular: RRR  Abdomen: Appropriately-tender, incision c/d/i and well with wound vac. Retrorectus drain RUQ, B/L lower abdominal drains, all draining serosanguinous fluid, small skin tear above rectorectus drain noted  Skin: warm and dry, no rashes  Extremities: no edema, no cyanosis  Neuro: A&Ox3, no focal deficits, sensation intact    LABS:   Recent Labs     10/25/22  0510 10/26/22  0630   WBC 8.1 6.4   HGB 9.0* 7.9*   HCT 27.6* 23.6*   MCV 95.7 95.1    171          Recent Labs     10/25/22  1507 10/26/22  0629   * 134*   K 4.2 3.7    102   CO2 19* 25   PHOS 3.8 3.4   BUN 17 14   CREATININE 1.9* 1.5*        No results for input(s): AST, ALT, ALB, BILIDIR, BILITOT, ALKPHOS in the last 72 hours. No results for input(s): LIPASE, AMYLASE in the last 72 hours. No results for input(s): PROT, INR, APTT in the last 72 hours.    No results for input(s): CKTOTAL, CKMB, CKMBINDEX, TROPONINI in the last 72 hours.      ASSESSMENT & PLAN:   This is a 56 y.o. female s/p open midline incisional hernia repair, internal hernia repair with closure of mesentary defect, bilateral transversus abdominus release, panniculectomy and Cfrso-md-Fjf abdominoplasty on 10/24/22 2/2 recurrent, incarcerated incisional hernia and panniculitis, POD#3.     - F/u AM labs  - Continue bowel reg  - Continue diet, advised on self-regulation, awaiting return of bowel function  - Continue fluid due to BRUCE, F/U labs  - Continue IS; encourage deep breathing  - Encourage OOB and ambulation in beach chair position at all times.    - Drain teaching prior to discharge   - Continue Ancef per plastic surgery  - Dispo per plastic surgery      Hakan Mckeon DO, [address]  PGY1, General Surgery  10/27/22  5:55 AM  Justin  Pager: [phone]

## 2022-10-27 NOTE — CARE COORDINATION
CTN attempted    [facility]- can't take  [facility] with no answer  [facility]- No staff  [facility] no nursing available  [facility] - not in [facility]- no return call  [facility] [phone]  Initial return call to [employer] stated that they could not take, but no reason given. CTN explained that their name was given by pcp Coleen Reece. [facility] is running benefits to see if there is another barrier to taking. It has been over an hour since initially speaking to [facility] today and still nothing back from benefits. She will call me once she hears.     Electronically signed by Anselmo Gan LPN on 64/47/8833 at 11:35 AM

## 2022-10-27 NOTE — CARE COORDINATION
CM following: CM met with pt and  with nursing team at bedside this AM. CM updated on struggles to identify a [facility] in [place]. CM and [facility] liaison continue to work on obtaining approval from [address] [place] [address] [phone] and will update pt as information becomes available.  CM will continue to follow for discharge planning  Electronically signed by JOSÉ Trejo on 10/27/2022 at 2:36 PM  [phone]

## 2022-10-28 VITALS
DIASTOLIC BLOOD PRESSURE: 78 MMHG | HEART RATE: 88 BPM | TEMPERATURE: 97.4 F | RESPIRATION RATE: 16 BRPM | WEIGHT: 202.16 LBS | HEIGHT: 59 IN | BODY MASS INDEX: 40.76 KG/M2 | SYSTOLIC BLOOD PRESSURE: 139 MMHG | OXYGEN SATURATION: 97 %

## 2022-10-28 LAB
ALBUMIN SERPL-MCNC: 2.4 G/DL (ref 3.4–5)
ANION GAP SERPL CALCULATED.3IONS-SCNC: 9 MMOL/L (ref 3–16)
BASOPHILS ABSOLUTE: 0 K/UL (ref 0–0.2)
BASOPHILS RELATIVE PERCENT: 0.6 %
BUN BLDV-MCNC: 13 MG/DL (ref 7–20)
CALCIUM SERPL-MCNC: 7.7 MG/DL (ref 8.3–10.6)
CHLORIDE BLD-SCNC: 103 MMOL/L (ref 99–110)
CO2: 22 MMOL/L (ref 21–32)
CREAT SERPL-MCNC: 1.2 MG/DL (ref 0.6–1.1)
EOSINOPHILS ABSOLUTE: 0.2 K/UL (ref 0–0.6)
EOSINOPHILS RELATIVE PERCENT: 7.1 %
GFR SERPL CREATININE-BSD FRML MDRD: 53 ML/MIN/{1.73_M2}
GLUCOSE BLD-MCNC: 118 MG/DL (ref 70–99)
HCT VFR BLD CALC: 23.2 % (ref 36–48)
HEMOGLOBIN: 7.7 G/DL (ref 12–16)
LYMPHOCYTES ABSOLUTE: 0.6 K/UL (ref 1–5.1)
LYMPHOCYTES RELATIVE PERCENT: 26 %
MAGNESIUM: 2 MG/DL (ref 1.8–2.4)
MCH RBC QN AUTO: 31.4 PG (ref 26–34)
MCHC RBC AUTO-ENTMCNC: 33.2 G/DL (ref 31–36)
MCV RBC AUTO: 94.6 FL (ref 80–100)
MONOCYTES ABSOLUTE: 0.6 K/UL (ref 0–1.3)
MONOCYTES RELATIVE PERCENT: 25.5 %
NEUTROPHILS ABSOLUTE: 1 K/UL (ref 1.7–7.7)
NEUTROPHILS RELATIVE PERCENT: 40.8 %
PDW BLD-RTO: 14 % (ref 12.4–15.4)
PHOSPHORUS: 2.5 MG/DL (ref 2.5–4.9)
PLATELET # BLD: 187 K/UL (ref 135–450)
PMV BLD AUTO: 8.1 FL (ref 5–10.5)
POTASSIUM SERPL-SCNC: 3.5 MMOL/L (ref 3.5–5.1)
RBC # BLD: 2.45 M/UL (ref 4–5.2)
SODIUM BLD-SCNC: 134 MMOL/L (ref 136–145)
WBC # BLD: 2.3 K/UL (ref 4–11)

## 2022-10-28 PROCEDURE — 94669 MECHANICAL CHEST WALL OSCILL: CPT

## 2022-10-28 PROCEDURE — 36415 COLL VENOUS BLD VENIPUNCTURE: CPT

## 2022-10-28 PROCEDURE — 6360000002 HC RX W HCPCS: Performed by: NURSE PRACTITIONER

## 2022-10-28 PROCEDURE — 99024 POSTOP FOLLOW-UP VISIT: CPT | Performed by: SURGERY

## 2022-10-28 PROCEDURE — 6370000000 HC RX 637 (ALT 250 FOR IP): Performed by: STUDENT IN AN ORGANIZED HEALTH CARE EDUCATION/TRAINING PROGRAM

## 2022-10-28 PROCEDURE — 94664 DEMO&/EVAL PT USE INHALER: CPT

## 2022-10-28 PROCEDURE — 85025 COMPLETE CBC W/AUTO DIFF WBC: CPT

## 2022-10-28 PROCEDURE — 80069 RENAL FUNCTION PANEL: CPT

## 2022-10-28 PROCEDURE — 6360000002 HC RX W HCPCS: Performed by: SURGERY

## 2022-10-28 PROCEDURE — C9113 INJ PANTOPRAZOLE SODIUM, VIA: HCPCS | Performed by: NURSE PRACTITIONER

## 2022-10-28 PROCEDURE — 6370000000 HC RX 637 (ALT 250 FOR IP): Performed by: SURGERY

## 2022-10-28 PROCEDURE — 6360000002 HC RX W HCPCS: Performed by: STUDENT IN AN ORGANIZED HEALTH CARE EDUCATION/TRAINING PROGRAM

## 2022-10-28 PROCEDURE — 83735 ASSAY OF MAGNESIUM: CPT

## 2022-10-28 PROCEDURE — 94640 AIRWAY INHALATION TREATMENT: CPT

## 2022-10-28 PROCEDURE — 2580000003 HC RX 258: Performed by: SURGERY

## 2022-10-28 PROCEDURE — 2580000003 HC RX 258: Performed by: STUDENT IN AN ORGANIZED HEALTH CARE EDUCATION/TRAINING PROGRAM

## 2022-10-28 RX ORDER — PSEUDOEPHEDRINE HCL 30 MG
100 TABLET ORAL 2 TIMES DAILY
Qty: 28 CAPSULE | Refills: 0 | Status: SHIPPED | OUTPATIENT
Start: 2022-10-28 | End: 2022-11-11

## 2022-10-28 RX ORDER — POLYETHYLENE GLYCOL 3350 17 G/17G
17 POWDER, FOR SOLUTION ORAL DAILY
Qty: 527 G | Refills: 1 | Status: SHIPPED | OUTPATIENT
Start: 2022-10-28 | End: 2022-11-27

## 2022-10-28 RX ORDER — ALBUTEROL SULFATE 90 UG/1
2 AEROSOL, METERED RESPIRATORY (INHALATION) EVERY 4 HOURS PRN
Qty: 18 G | Refills: 0 | Status: SHIPPED | OUTPATIENT
Start: 2022-10-28

## 2022-10-28 RX ORDER — DIAZEPAM 5 MG/1
5 TABLET ORAL EVERY 6 HOURS PRN
Qty: 28 TABLET | Refills: 0 | Status: SHIPPED | OUTPATIENT
Start: 2022-10-28 | End: 2022-11-07

## 2022-10-28 RX ORDER — PROMETHAZINE HYDROCHLORIDE 12.5 MG/1
12.5 TABLET ORAL EVERY 6 HOURS PRN
Qty: 30 TABLET | Refills: 0 | Status: SHIPPED | OUTPATIENT
Start: 2022-10-28

## 2022-10-28 RX ORDER — CEPHALEXIN 500 MG/1
500 CAPSULE ORAL 4 TIMES DAILY
Qty: 40 CAPSULE | Refills: 0 | Status: SHIPPED | OUTPATIENT
Start: 2022-10-28 | End: 2022-11-07

## 2022-10-28 RX ORDER — ONDANSETRON 4 MG/1
4 TABLET, FILM COATED ORAL EVERY 8 HOURS PRN
Qty: 30 TABLET | Refills: 1 | Status: SHIPPED | OUTPATIENT
Start: 2022-10-28

## 2022-10-28 RX ORDER — NALOXONE HYDROCHLORIDE 4 MG/.1ML
1 SPRAY NASAL PRN
Qty: 2 EACH | Refills: 1 | Status: SHIPPED | OUTPATIENT
Start: 2022-10-28

## 2022-10-28 RX ORDER — METOCLOPRAMIDE HYDROCHLORIDE 5 MG/ML
5 INJECTION INTRAMUSCULAR; INTRAVENOUS EVERY 6 HOURS
Status: DISCONTINUED | OUTPATIENT
Start: 2022-10-28 | End: 2022-10-28 | Stop reason: HOSPADM

## 2022-10-28 RX ORDER — OXYCODONE HYDROCHLORIDE 5 MG/1
5 TABLET ORAL EVERY 6 HOURS PRN
Qty: 28 TABLET | Refills: 0 | Status: SHIPPED | OUTPATIENT
Start: 2022-10-28 | End: 2022-11-04

## 2022-10-28 RX ADMIN — OXYCODONE 10 MG: 5 TABLET ORAL at 09:38

## 2022-10-28 RX ADMIN — ONDANSETRON 4 MG: 2 INJECTION INTRAMUSCULAR; INTRAVENOUS at 13:02

## 2022-10-28 RX ADMIN — POLYETHYLENE GLYCOL 3350 17 G: 17 POWDER, FOR SOLUTION ORAL at 09:40

## 2022-10-28 RX ADMIN — HEPARIN SODIUM 5000 UNITS: 5000 INJECTION INTRAVENOUS; SUBCUTANEOUS at 06:19

## 2022-10-28 RX ADMIN — BUDESONIDE INHALATION SUSPENSION 500 MCG: 0.5 SUSPENSION RESPIRATORY (INHALATION) at 08:50

## 2022-10-28 RX ADMIN — OXYCODONE 10 MG: 5 TABLET ORAL at 13:02

## 2022-10-28 RX ADMIN — ALBUTEROL SULFATE 2.5 MG: 2.5 SOLUTION RESPIRATORY (INHALATION) at 08:49

## 2022-10-28 RX ADMIN — ACETAMINOPHEN 650 MG: 325 TABLET ORAL at 00:34

## 2022-10-28 RX ADMIN — DOCUSATE SODIUM 100 MG: 100 CAPSULE, LIQUID FILLED ORAL at 09:40

## 2022-10-28 RX ADMIN — BUPROPION HYDROCHLORIDE 150 MG: 150 TABLET, FILM COATED, EXTENDED RELEASE ORAL at 09:40

## 2022-10-28 RX ADMIN — ONDANSETRON 4 MG: 2 INJECTION INTRAMUSCULAR; INTRAVENOUS at 00:33

## 2022-10-28 RX ADMIN — ACETAMINOPHEN 650 MG: 325 TABLET ORAL at 06:18

## 2022-10-28 RX ADMIN — BUSPIRONE HYDROCHLORIDE 15 MG: 10 TABLET ORAL at 09:38

## 2022-10-28 RX ADMIN — FAMOTIDINE 20 MG: 20 TABLET ORAL at 09:40

## 2022-10-28 RX ADMIN — SODIUM CHLORIDE, POTASSIUM CHLORIDE, SODIUM LACTATE AND CALCIUM CHLORIDE: 600; 310; 30; 20 INJECTION, SOLUTION INTRAVENOUS at 05:03

## 2022-10-28 RX ADMIN — SODIUM CHLORIDE, PRESERVATIVE FREE 10 ML: 5 INJECTION INTRAVENOUS at 09:41

## 2022-10-28 RX ADMIN — CEFAZOLIN 2000 MG: 2 INJECTION, POWDER, FOR SOLUTION INTRAMUSCULAR; INTRAVENOUS at 03:24

## 2022-10-28 RX ADMIN — ARFORMOTEROL TARTRATE 15 MCG: 15 SOLUTION RESPIRATORY (INHALATION) at 08:50

## 2022-10-28 RX ADMIN — PANTOPRAZOLE SODIUM 40 MG: 40 INJECTION, POWDER, LYOPHILIZED, FOR SOLUTION INTRAVENOUS at 09:41

## 2022-10-28 ASSESSMENT — PAIN SCALES - GENERAL
PAINLEVEL_OUTOF10: 5
PAINLEVEL_OUTOF10: 7
PAINLEVEL_OUTOF10: 6
PAINLEVEL_OUTOF10: 7
PAINLEVEL_OUTOF10: 7

## 2022-10-28 ASSESSMENT — PAIN DESCRIPTION - LOCATION
LOCATION: ABDOMEN
LOCATION: ABDOMEN;BACK
LOCATION: ABDOMEN

## 2022-10-28 NOTE — PROGRESS NOTES
Went over discharge paperwork with patient and family. All questions answered. IV removed intact without complications.      Electronically signed by Veronica Gonzalez RN on 10/28/2022 at 1:34 PM

## 2022-10-28 NOTE — PROGRESS NOTES
Plastic Surgery   Daily Progress Note  Patient: Cassius García      CC: Incarcerated midline incisional hernia, panniculitis     SUBJECTIVE:   Patient rested well overnight. Nausea improved, no emesis. Tolerating more intake. Some flatus, no BM. Has been up ambulating in halls. Sitting up. Pain controlled. ROS:   A 14 point review of systems was conducted, significant findings as noted above. All other systems negative. OBJECTIVE:    PHYSICAL EXAM:    Vitals:    10/27/22 1928 10/27/22 2032 10/27/22 2340 10/28/22 0315   BP: (!) 150/98  (!) 155/80 (!) 130/92   Pulse: 90 90 89 88   Resp: 18 18 18 18   Temp: 99.3 °F (37.4 °C)  98.3 °F (36.8 °C) 98.1 °F (36.7 °C)   TempSrc: Oral  Oral Oral   SpO2: 93% 95% 95% 96%   Weight:       Height:           General appearance: Alert, no acute distress, grooming appropriate  Eyes: No scleral icterus, EOM grossly intact  Neck: Trachea midline, no JVD  Chest/Lungs: Normal effort with no accessory muscle use on RA  Cardiovascular: RRR  Abdomen: Appropriately-tender, incision c/d/i and well with wound vac. Retrorectus drain RUQ, B/L lower abdominal drains, all draining serosanguinous fluid, small skin tear above rectorectus drain noted  Skin: warm and dry, no rashes  Extremities: no edema, no cyanosis  Neuro: A&Ox3, no focal deficits, sensation intact    LABS:   Recent Labs     10/27/22  0714 10/28/22  0442   WBC 7.6 2.3*   HGB 9.1* 7.7*   HCT 27.4* 23.2*   MCV 94.5 94.6    187          Recent Labs     10/27/22  0714 10/28/22  0442    134*   K 4.4 3.5    103   CO2 26 22   PHOS 3.4 2.5   BUN 12 13   CREATININE 1.3* 1.2*        No results for input(s): AST, ALT, ALB, BILIDIR, BILITOT, ALKPHOS in the last 72 hours. No results for input(s): LIPASE, AMYLASE in the last 72 hours. No results for input(s): PROT, INR, APTT in the last 72 hours. No results for input(s): CKTOTAL, CKMB, CKMBINDEX, TROPONINI in the last 72 hours.       ASSESSMENT & PLAN:   This is a 56 y.o. female s/p open midline incisional hernia repair, internal hernia repair with closure of mesentary defect, bilateral transversus abdominus release, panniculectomy and Wbghw-zu-Kzj abdominoplasty on 10/24/22 2/2 recurrent, incarcerated incisional hernia and panniculitis, POD#4    - Continue bowel reg  - Continue general diet  - SLIV   - Encourage OOB and ambulation in beach chair position at all times. - Drain teaching prior to discharge   - Continue Ancef - will transition to keflex at d/c  - Will discuss with staff hand-held Prevena vs continued wound vac Prevena   - Pt feels capable of taking care of ROGER drains. Since Prevena dressing will not be changed, unlikely to need [address] at discharge   - Dispo home later today     Karlene Garcia MD  PGY1, General Surgery  10/28/22  6:24 AM  DARINELTINA#[phone]     I saw and independently examined the patient today. I agree with the history of present illness, past medical/surgical histories, family history, social history, medication list and allergies as listed. The review of systems is as noted above. My physical exam confirms the findings listed above. Review of labs, pathology reports, radiology reports and medical records confirm the findings noted above. I edited the note where appropriate in italics, strikethrough font, or underline. (LATE ENTRY PATIENT SEEN 10/28 PM)    Doing well. Plan for [place] home.     Pedro Giraldo MD  [address] [place] [address] P [facility] Reconstructive Surgery  [phone]  10/29/22

## 2022-10-28 NOTE — PROGRESS NOTES
Bariatric Surgery   Daily Progress Note  Patient: Yumiko Starks      CC: Incarcerated midline incisional hernia, panniculitis     SUBJECTIVE:   Patient rested well overnight. Nausea improve, tolerating more intake. Minimal flatus, no BM.    ROS:   A 14 point review of systems was conducted, significant findings as noted above. All other systems negative. OBJECTIVE:    PHYSICAL EXAM:    Vitals:    10/27/22 1928 10/27/22 2032 10/27/22 2340 10/28/22 0315   BP: (!) 150/98  (!) 155/80 (!) 130/92   Pulse: 90 90 89 88   Resp: 18 18 18 18   Temp: 99.3 °F (37.4 °C)  98.3 °F (36.8 °C) 98.1 °F (36.7 °C)   TempSrc: Oral  Oral Oral   SpO2: 93% 95% 95% 96%   Weight:       Height:           General appearance: Alert, no acute distress, grooming appropriate  Eyes: No scleral icterus, EOM grossly intact  Neck: Trachea midline, no JVD  Chest/Lungs: Normal effort with no accessory muscle use on RA  Cardiovascular: RRR  Abdomen: Appropriately-tender, incision c/d/i and well with wound vac. Retrorectus drain RUQ, B/L lower abdominal drains, all draining serosanguinous fluid, small skin tear above rectorectus drain noted  Skin: warm and dry, no rashes  Extremities: no edema, no cyanosis  Neuro: A&Ox3, no focal deficits, sensation intact    LABS:   Recent Labs     10/27/22  0714 10/28/22  0442   WBC 7.6 2.3*   HGB 9.1* 7.7*   HCT 27.4* 23.2*   MCV 94.5 94.6    187          Recent Labs     10/27/22  0714 10/28/22  0442    134*   K 4.4 3.5    103   CO2 26 22   PHOS 3.4 2.5   BUN 12 13   CREATININE 1.3* 1.2*        No results for input(s): AST, ALT, ALB, BILIDIR, BILITOT, ALKPHOS in the last 72 hours. No results for input(s): LIPASE, AMYLASE in the last 72 hours. No results for input(s): PROT, INR, APTT in the last 72 hours. No results for input(s): CKTOTAL, CKMB, CKMBINDEX, TROPONINI in the last 72 hours.       ASSESSMENT & PLAN:   This is a 56 y.o. female s/p open midline incisional hernia repair, internal hernia

## 2022-10-28 NOTE — CARE COORDINATION
Case Management Assessment            Discharge Note                    Date / Time of Note: 10/28/2022 11:33 AM                  Discharge Note Completed by: JOSÉ Barros    Patient Name: Maritza Wang   YOB: 1965  Diagnosis: Recurrent incisional hernia [K43.2]  Panniculitis [M79.3]  Incisional hernia of anterior abdominal wall without obstruction or gangrene [K43.2]   Date / Time: 10/24/2022  5:21 AM    Current PCP: *[address] [place] [address] patient: No    Hospitalization in the last 30 days: No    Advance Directives:  Code Status: Full Code  [place] DNR form completed and on chart: Not Indicated    Financial:  Payor: Chris Manuel 150 / Plan: Viviano Sever / Product Type: *No Product type* /      Pharmacy:    [facility]/pharmacy #[id number] - [place], [address] [phone]  [address] [facility] # [address]  Phone: [phone] Fax: [phone]    Chris Cheney 58, [address] [facility] [phone] -  [phone]  [address] [place] [address]  Phone: [phone] Fax: [phone]      Assistance purchasing medications?: Potential Assistance Purchasing Medications: No  Assistance provided by Case Management: None at this time    Does patient want to participate in local refill/ meds to beds program?: Yes    Meds To Beds General Rules:  1. Can ONLY be done Monday- Friday between 8:30am-5pm  2. Prescription(s) must be in pharmacy by 3pm to be filled same day  3. Copy of patient's insurance/ prescription drug card and patient face sheet must be sent along with the prescription(s)  4. Cost of Rx cannot be added to hospital bill. If financial assistance is needed, please contact unit  or ;  or  CANNOT provide pharmacy voucher for patients co-pays  5.  Patients can then  the prescription on their way out of the hospital at discharge, or pharmacy can deliver to the bedside if staff is available. (payment due at time of pick-up or delivery - cash, check, or card accepted)     Able to afford home medications/ co-pay costs: Yes    ADLS:  Current PT AM-PAC Score:   /24  Current OT AM-PAC Score:   /24      DISCHARGE Disposition: Home- No Services Needed    LOC at discharge: Not Applicable  ANTONIO Completed: Not Indicated    Notification completed in HENS/PAS?:  Not Applicable    IMM Completed:   Not Indicated    Transportation:  Transportation PLAN for discharge: family   Mode of Transport: Private Car  Reason for medical transport: Not Applicable  Name of [address] [place] [address],[address]: Not Applicable  Time of Transport: n/a    Transport form completed: Not Indicated    Home Care:  Home Care ordered at discharge: No  [address]: Not Applicable  Orders faxed: No    Referrals made at [facility] for outpatient continued care:  Not Applicable    Additional CM Notes: CM met with pt and nurse team at bedside. Pt is from home with  in [place]. Pt will return home with her  at discharge and pt's  will provide transportation home. CM has updated pt about insurance challenges with [facility]. 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 Pt denies any additional CM needs at discharge. The Plan for Transition of Care is related to the following treatment goals of Recurrent incisional hernia [K43.2]  Panniculitis [M79.3]  Incisional hernia of anterior abdominal wall without obstruction or gangrene [K43.2]    The Patient and/or patient representative Maral Aabd and her family were provided with a choice of provider and agrees with the discharge plan Yes    Freedom of choice list was provided with basic dialogue that supports the patient's individualized plan of care/goals and shares the quality data associated with the providers.  Yes    Care Transitions patient: No    JOÉS Love  [facility]  Case Management Department  Ph: [phone]  Fax: [phone]

## 2022-10-28 NOTE — DISCHARGE INSTRUCTIONS
[facility]    Nabil Biswas MD  [address] [place], [place] [address]  [phone]    Post-op Instructions  ___________________________________________    Abdominoplasty     The following instructions will help you know what to expect in the days following your surgery. Do not, however, hesitate to call if you have questions or concerns. Activities   No driving, lifting, or strenuous activity. Walking is necessary after you are discharged to prevent blood clots. Walk for 10 to 15 minutes every 2 hours. Do not sit up directly from the reclining position. When getting out of bed, turn onto your side, slide your legs out of bed, and use your arms to push yourself upright. When sitting, use the spirometer (breathing device) 10 times every 2 hours minimum. Wear your abdominal binder day and night for 3 weeks. Keep it snug. This reduces bruising and prevents fluid from accumulating under the flap. During the first few days after surgery, most patients find it more comfortable to sleep in a flexed position. Use pillows to keep your head and shoulders elevated, and place a pillow under your knees. If you are not comfortable sleeping in this position, you may sleep flat. It is more important to get a good nights rest   You may shower 48 hours after surgery. Be sure to replace your abdominal binder snugly after your shower. Do not tub bathe or use hot water in the shower. Warm water is okay. Do not exercise or do any activity that raises your heart rate or blood pressure for 2 to 3 weeks. You may resume light activity such as walking and light lifting (up to 5 pounds) within the first few days after surgery. At your follow-up appointment you may discuss resuming any strenuous activity such as biking, swimming, aerobics, or weightlifting (usually three to six weeks). Also, when returning to more exercise, start slowly and gradually work up to your daily routine. Diet   Resume a normal diet as tolerated. Special Instructions     There is absolutely NO driving while on pain medication or Valium® (see medicine list given to you with your pre-operative paperwork)    It is extremely important that you do not smoke or have any form of nicotine for a minimum of 2 weeks after surgery. Smoking might delay healing and increase your risk of postoperative complications. Do NOT take any of the following products:   Aspirin/low-dose aspirin   Ibuprofen (Advil®, Motrin®)   Naprosyn or Naproxen (Aleve®)   Vitamin E (even small amounts in multiple vitamins)   Herbals or homeopathic medicines or green tea   Protein supplements (shakes, energy drinks)   Growth hormone   Diet pills (Meridia®, Metabolife®, etc)    TYLENOL-containing products (acetaminophen) are safe to take. (If you not are not sure what products to take or avoid, call the office.)    Medications  Take your medications as prescribed. Antibiotic to prevent infection:   Pain medication, if needed:   Valium to prevent muscle spasm:   Other: Lactobacillus (Culturelle) probiotic while taking antibiotics. You can obtain this over the counter or within any yogurt that specifically has the active ingredient: L. acidophilus (Lactobacillus acidophilus). Wound Care   If you are discharged with drains in, record the amount that you empty every eight hours or when 1/3 full. Make sure to keep the outputs for each drain separate, so we can determine the output for each individual drain at your follow-up appointment. Total the output in cc for each 24-hour period on the Drainage Record Sheet. We will want to know this amount. The drainage can vary in color from yellowish to red. Drains will not be removed until each drain has less than 30 cc in a 24-hour period for 2 consecutive days. Once your drains are removed, fluid can leak from the drain holes.  This can occur when walking or rolling over in bed and can saturate your clothes or linens. This is normal.   Each day, apply a small amount of antibiotic ointment (Bacitracin®, Neosporin®, or Bactroban®) to your belly button and to the drain holes. It is normal to experience swelling and bruising of the abdomen and groin for the first few weeks after surgery. Other normal experiences include:   Tightness and discomfort of the abdominal muscles for several weeks   Areas of numbness of the abdominal skin for up to six months   A small amount of drainage during the first few days   Redness of surgical scars for three to six months after surgery (This will then slowly fade.)     Miscellaneous   If you are unable to urinate once you are home, call the office and you will be directed as to what to do. Follow up  Usually there are no sutures to remove. You will have a follow up appointment when you are ready to have the drains removed, approximately 1 week after surgery. Please call your doctors office at the first sign of:   Excessive (severe) pain associated with pressure and enlargement of the abdomen   Redness, drainage, or odor from the incision(s) or drains   Fever or chills   Shortness of breath   It is best to avoid exposing the scar to the sun and to wear sun protection of at least SPF 30 for at least 6 months after surgery.

## 2022-10-28 NOTE — PROGRESS NOTES
Pt alert and oriented x4. Pt complained abd pain and well controlled by PRN and routine medications. Pt JPs with moderate output, see flowsheet. Pt surgical site CDI with wound vac. Pt resting comfortably in bed in beachchair position. Pt used bathroom voiding x2. Encouraged oral fluid as tolerated. Pt has IVF infusing per orders. Pt. Ambulated in hallway and tolerated well, but needs to remind to bending frequently while walking. Educated Pt to keep binder on because pt removed binder while in bed and Pt stated \"understand\" and the nurse re-applied the binder. Pt has call light within reach, bed in lowest position with wheels locked, 2/4 side rails up, and bed alarm on. Will continue to monitor.  BP (!) 155/80   Pulse 89   Temp 98.3 °F (36.8 °C) (Oral)   Resp 18   Ht 4' 11\" (1.499 m)   Wt 202 lb 2.6 oz (91.7 kg)   SpO2 95%   BMI 40.83 kg/m²

## 2022-10-28 NOTE — CARE COORDINATION
CM following: CM spoke with Jovita Butt [phone] at [facility]. Maribell reports that when their office ran benefits they discovered a second insurance plan and the plans were not in agreement on which was primary. Maribell instructed CM to have family call the Coordination of Benefits line for the insurance plan not on file with the hospital.     CM spoke with pt and  at bedside. Pt reports that this additional insurance plan is no longer active and that they have called to get this resolved before. Pt's  will call the insurance company this AM to clarify that they are the only active insurance company. CM will come back to room later this AM and then CM will reach back out to Jovita Butt with [facility].   Electronically signed by JOSÉ Agrawal on 10/28/2022 at 9:44 AM  [phone]

## 2022-10-28 NOTE — PROGRESS NOTES
Pt A&Ox4 VSS. Pr denies N/V reports pain in abd. TX Roxicodone given for pain, check MAR. Pt tolerating PO intake. Hypoactive bowel sounds in all 4 quadrants. Surgical site CDI with wound vac in place. 3 ROGER stripped with serosanguinous drainage noted. Pt in bed with wheels locked and in the lowest position with call light and personal belongings in reach.      Electronically signed by Saumya Azar RN on 10/28/2022 at 11:02 AM

## 2022-10-30 NOTE — OP NOTE
[facility]               [address]                                OPERATIVE REPORT    PATIENT NAME: Diallo Hansen                      :        1965  MED REC NO:   [id number]                          ROOM:       [id number]  ACCOUNT NO:   [de-identified]                           ADMIT DATE: 10/24/2022  PROVIDER:     Kennedi De Jesus DO    DATE OF PROCEDURE:  10/24/2022    PREOPERATIVE DIAGNOSIS:  Recurrent incarcerated incisional hernia. POSTOPERATIVE DIAGNOSES:  1. Recurrent incarcerated incisional hernia. 2.  Internal mesenteric hernia. OPERATIONS PERFORMED:  1. Open recurrent incarcerated incisional hernia repair with  retrorectus mesh placement. 2.  Bilateral posterior component separation. 3.  Bilateral transversus abdominis release. 4.  Repair of internal hernia with closure of mesenteric defect. SURGEON:  Kennedi De Jesus DO    CO-SURGEON:  Dipesh Fisher MD    ANESTHESIA:  General endotracheal.    COMPLICATIONS:  None. CONDITION:  Stable. ESTIMATED BLOOD LOSS:  200 mL. INDICATIONS FOR PROCEDURE:  The patient is a 17-year-old female who had  multiple previous hernia repairs and panniculitis. She had a  significant weight loss after undergoing a sleeve gastrectomy which was  then converted to Dwight-en-Y gastric bypass. She was prehabilitated and  after weight loss, she was scheduled for hernia repair after  understanding all the risks, benefits, and alternatives of the  procedure. DESCRIPTION OF PROCEDURE:  The patient was brought to the operative  suite, laid in supine position with arms outstretched and after  induction of general endotracheal anesthesia, tube was confirmed to be  in place with end-tidal CO2 and secured. Lo catheter was placed with  clear return of urine. The patient was prepped and draped in the usual  sterile manner. A midline incision was made using a 10 blade.   Dissection was carried  down through the subcutaneous tissue and to the fascia. Peritoneum was  elevated, incised, and abdomen was entered. Attention was paid to the intestine which was identified to ensure that  no injury to the bowel was visualized. The ligament of Treitz was found and  small bowel was run all the way to the jejunojejunostomy anastomosis and  followed to the Dwight limb as well as common channel. At the  jejunojejunal anastomosis, a loop of small bowel was seen to be sneaking  in through a small mesenteric window which was a potential risk factor  for small-bowel obstruction. The small bowel was completely reduced and  attention was paid to the internal hernia. 3-0 silk sutures were used  to close this mesenteric window with multiple figure-of-eight sutures. Once this window was completed, the small bowel was inspected and  ensured to be in its appropriate anatomic location. Adhesions from the omentum to the previous mesh and tacks were freed with   ease and several metallic tacks from the previous hernia repair were easily   removed. At this point, a large hernia was appreciated and our reconstruction   was started. Left-sided rectus myofascial release was first performed by dissecting  the posterior sheath off the rectus muscle by incising the posterior  sheath and taking this dissection all the way to the lateral border of  the rectus muscle. The neurovascular bundle was identified and  preserved and dissection was carried down far above and well below the  hernia defects going down into the preperitoneal space and mobilizing  the bladder flap as well as going superiorly all the way up to the  inferior border of the costal margin. Once this was accomplished, this  completed our first myofascial release.   (MYOFASCIAL RELEASE #1)    Contralateral right-sided release was performed by incising the  posterior sheath along the length of the right-sided rectus muscle and  dissection was carried down through this plane all the way to the  lateral border of the rectus muscle. The neurovascular bundle was  identified and preserved and dissection was carried far superior and  well below the hernia defects to match the dissection on the left side  connecting both inferiorly along the bladder flap as well as superiorly  along the inferior margin of the costal region. This concluded our  second myofascial release. (MYOFASCIAL RELEASE #2)    At this point, although the bilateral rectus myofascial releases were  performed, we did not have adequate mobilization of the posterior sheath  for our mesh placement as well as inadequate mobilization of the fascia  for a tension-free closure. Therefore, decision was made to perform a  bilateral transversus abdominis release. The left-sided transversus abdominis release was begun by incising the  transversus abdominis muscle superiorly and entering the plane between  the transversus abdominis muscle and the peritoneum. This plane was  entered by incising the robust superior third of the transversus  abdominis muscle and once this plane was entered, this was taken  inferiorly and laterally to allow adequate mobilization and space for  our mesh. This dissection was carried inferiorly to match the previous  dissection and once this was accomplished, this concluded our third  myofascial release. (MYOFASCIAL RELEASE #3)    Attention was then paid to the contralateral right-sided transversus  abdominis release and the transversus abdominis muscle on the right side  was dissected and plane was entered between the transversus abdominis  muscle and the peritoneum on the right side. This dissection was taken  lateral to the ASIS and rib cage and this plane was mobilized with  meticulous dissection to ensure hemostasis while this dissection was  performed. This allowed mobilization of both the posterior sheath as  well as the anterior fascia for tension-free closure.   This concluded  our fourth and final myofascial release. (MYOFASCIAL RELEASE #4)    At that point, the posterior sheath was closed using a running 0 Vicryl  suture. A 30 x 30-cm Phasix mesh was then placed in antibiotic solution  and placed into the retrorectus space and fixated bilaterally at the  inferior costal margin using 0 PDS suture. Once this was accomplished,  Vistaseal was sprayed into this plane to ensure hemostasis and fixation  of the mesh and a 15-Austrian drain was placed into this space. At that  point, the closure of the anterior fascia was performed using a #1  looped PDS x2.  Closure of the anterior fascia was performed without any  undue tension and hernia repair was completed. Please see Dr. Royetta Goodell note for the panniculectomy. All sponge, needle,  and instrument counts were correct x2. I personally spoke to the patient's  of the findings of the  operation.         Jose Carlos Dobbs DO    D: 10/29/2022 17:27:21       T: 10/29/2022 17:30:28     [email or web address]  Job#: [id number]     Doc#: [id number]    CC:

## 2022-11-01 NOTE — DISCHARGE SUMMARY
Physician Discharge Summary     Patient ID:  Jon Ryan  [id number]  56 y.o.  1965    Admit date: 10/24/2022    Discharge date and time: 10/28/2022  1:30 PM     Admitting Physician: Georgina Santoro MD     Discharge Physician: same    Admission Diagnoses: Recurrent incisional hernia [K43.2]  Panniculitis [M79.3]  Incisional hernia of anterior abdominal wall without obstruction or gangrene [K43.2]    Discharge Diagnoses: same    Admission Condition: fair    Discharged Condition: stable    Indication for Admission: post op care    Hospital Course: 56year old female admitted to the hospital with recurrent incisional hernia and panniculitis. She went to OR where she underwent open midline incisional hernia repair, internal hernia repair with closure of mesentary defect, bilateral transversus abdominus release, panniculectomy and Qqueh-ht-Pdy abdominoplasty. Her surgery was uneventful and she was admitted to post op surgical floor in stable condition. Her padilla was removed on POD#1. She was started on a diet but she was experiencing post op nausea and poor appetite most likely experiencing anticipated post op ileus. She was observed over the next couple of days for return of GI function. Her pain was monitored with adjustments made to the regimen to help her increase in activity. Finally on POD # 4 she tolerating oral intake without any associated nausea or vomiting. Her pain was tolerable on oral medications. She was discharged home in stable condition with hospital vac to maintain adequate seal of her dressing. She was instructed on importance of activity and movement of lower extremities with her long ride home.       Disposition: home      Activity: no driving while on analgesics and no heavy lifting for 6 weeks  Diet: regular diet  Wound Care: as directed    Follow-up with Dr. Tian Hatch in 10 days     Signed:  AVNI Bryan CNP  11/1/2022  12:35 PM

## 2022-11-03 ENCOUNTER — TELEPHONE (OUTPATIENT)
Dept: BARIATRICS/WEIGHT MGMT | Age: 57
End: 2022-11-03

## 2022-11-03 NOTE — TELEPHONE ENCOUNTER
Patient called to schedule a six-week post-op appointment with Dr. Alesia Rollins and Dr. Syed Bush on the same day. Surgery was 10/24/2022.     Please call back at [phone]

## 2022-11-04 ENCOUNTER — TELEPHONE (OUTPATIENT)
Dept: SURGERY | Age: 57
End: 2022-11-04

## 2022-11-04 NOTE — TELEPHONE ENCOUNTER
Called patient. Patient has a wound vac on and drains in place. Needs a minimum of a 1 week post op and a 2 week post op appt. Patient scheduled Monday 11/7.

## 2022-11-04 NOTE — TELEPHONE ENCOUNTER
Called patient to set up the 6wk post op visit. She is s/p open midline incisional hernia repair, internal hernia repair with closure of mesentary defect, bilateral transversus abdominus release, panniculectomy and Rqtkk-ry-Lsm abdominoplasty 10/24/22 by both Dr. Mini Ha and Jacinta Hoff. She is scheduled w/ both providers on 12/14/22 11:15a & 11:30a. States was was at ER yesterday with some incoherent behaviors.  told her she was incoherent and speaking to people not even there. She was on 2 pain meds they think were having a reaction. Switched her med to Vicodin. Wanted providers to know.

## 2022-11-07 ENCOUNTER — OFFICE VISIT (OUTPATIENT)
Dept: SURGERY | Age: 57
End: 2022-11-07

## 2022-11-07 VITALS — TEMPERATURE: 98.2 F | HEART RATE: 88 BPM | OXYGEN SATURATION: 98 % | BODY MASS INDEX: 40.8 KG/M2 | WEIGHT: 202 LBS

## 2022-11-07 DIAGNOSIS — Z09 POSTOP CHECK: Primary | ICD-10-CM

## 2022-11-07 PROCEDURE — 99024 POSTOP FOLLOW-UP VISIT: CPT | Performed by: SURGERY

## 2022-11-07 NOTE — PROGRESS NOTES
[facility]    PROCEDURE: AWR with FdL abdominoplasty  DATE: 10/24/22    Eliazar Pham has been recovering well since her procedure. Pain has been well controlled without pain medications. She went to the ED for confusion secondary to medication and has noted that this has improved significantly. She also notes that she developed edema after surgery, which has been improving (patient notes this is a common occurrence with all of her previous surgeries). EXAM    Pulse 88   Temp 98.2 °F (36.8 °C)   Wt 202 lb (91.6 kg)   SpO2 98%   BMI 40.80 kg/m²     GEN: NAD   ABD: Incision healing well  Good contour / no hematoma / seroma  Drains serosang    IMP: 56 y. o.female s/p AWR with panniculectomy and FdL abdominoplasty  PLAN: Vac and 2 of 3 drains removed. Will have her last drain removed by her PCP (patient request given the driving distance). Will then return to see us in 6 weeks.      Sheila Forte MD  [address] Reconstructive Surgery  [phone]  11/07/22

## 2022-12-14 ENCOUNTER — OFFICE VISIT (OUTPATIENT)
Dept: BARIATRICS/WEIGHT MGMT | Age: 57
End: 2022-12-14

## 2022-12-14 ENCOUNTER — OFFICE VISIT (OUTPATIENT)
Dept: SURGERY | Age: 57
End: 2022-12-14

## 2022-12-14 VITALS
BODY MASS INDEX: 29.43 KG/M2 | HEIGHT: 59 IN | WEIGHT: 146 LBS | DIASTOLIC BLOOD PRESSURE: 81 MMHG | HEART RATE: 68 BPM | TEMPERATURE: 97.4 F | SYSTOLIC BLOOD PRESSURE: 144 MMHG

## 2022-12-14 VITALS — WEIGHT: 146 LBS | HEIGHT: 59 IN | BODY MASS INDEX: 29.43 KG/M2

## 2022-12-14 DIAGNOSIS — K43.0 RECURRENT INCISIONAL HERNIA WITH INCARCERATION: Primary | ICD-10-CM

## 2022-12-14 DIAGNOSIS — Z09 POSTOP CHECK: Primary | ICD-10-CM

## 2022-12-14 PROCEDURE — 99024 POSTOP FOLLOW-UP VISIT: CPT | Performed by: SURGERY

## 2022-12-14 NOTE — PROGRESS NOTES
[facility]    PROCEDURE: AWR with FdL abdominoplasty  DATE: 10/24/22    Angela Bonner has been recovering well since her procedure. Pain has been well controlled without pain medications. She went to the ED for confusion secondary to medication and has noted that this has improved significantly. She also notes that she developed edema after surgery, which has been improving (patient notes this is a common occurrence with all of her previous surgeries). EXAM    BP (!) 144/81   Pulse 68   Temp 97.4 °F (36.3 °C) (Temporal)   Ht 4' 11\" (1.499 m)   Wt 146 lb (66.2 kg)   BMI 29.49 kg/m²       GEN: NAD   ABD: Incision healing well  Good contour / no hematoma / seroma  Umbilicus viable     IMP: 56 y. o.female s/p AWR with panniculectomy and FdL abdominoplasty  PLAN: Doing very well. She is very happy with her results. Will return in 6-7 months for evaluation for additional body contouring.     Ruthie Christie MD  [address] Reconstructive Surgery  [phone]  12/14/22

## 2022-12-30 NOTE — PROGRESS NOTES
Patient doing well  No N/V/F/C  Tolerating diet  Having regular BM's    Incisions C/D/I    S/P Incisional Hernia repair    F/U SALONIN    Cherylene Glee

## 2023-01-26 NOTE — CARE COORDINATION
Case Management Assessment  Initial Evaluation    Date/Time of Evaluation: 10/25/2022 2:39 PM  Assessment Completed by: JOSÉ Anderson    If patient is discharged prior to next notation, then this note serves as note for discharge by case management. Patient Name: Keyonna Lubin                   YOB: 1965  Diagnosis: Recurrent incisional hernia [K43.2]  Panniculitis [M79.3]  Incisional hernia of anterior abdominal wall without obstruction or gangrene [K43.2]                   Date / Time: 10/24/2022  5:21 AM    Patient Admission Status: Inpatient   Readmission Risk (Low < 19, Mod (19-27), High > 27): Readmission Risk Score: 13.5    Current PCP: *[facility]  PCP verified by CM? Yes (Dr. Michelle Estevez: [phone] F: [phone])    Chart Reviewed: Yes      History Provided by: Patient  Patient Orientation: Alert and Oriented    Patient Cognition: Alert    Hospitalization in the last 30 days (Readmission):  No    If yes, Readmission Assessment in CM Navigator will be completed. Advance Directives:      Code Status: Full Code   Patient's Primary Decision Maker is: Legal Next of Kin      Discharge Planning:    Patient lives with: Spouse/Significant Other Type of Home: House  Primary Care Giver: Self  Patient Support Systems include: Spouse/Significant Other   Current Financial resources:    Current community resources:    Current services prior to admission: None            Current DME:              Type of Home Care services:  Nursing Services    ADLS  Prior functional level: Independent in ADLs/IADLs  Current functional level: Independent in ADLs/IADLs    PT AM-PAC:   /24  OT AM-PAC:   /24    Family can provide assistance at DC: Yes  Would you like Case Management to discuss the discharge plan with any other family members/significant others, and if so, who?     Plans to Return to Present Housing: Yes  Other Identified Issues/Barriers to RETURNING to current housing: none  Potential Assistance needed at discharge: [address]            Potential DME:    Patient expects to discharge to: [facility] for transportation at discharge: (P) Family    Financial    Payor: [employer] / Plan: [address] [place] [address] / Product Type: *No Product type* /     Does insurance require precert for SNF: Yes    Potential assistance Purchasing Medications: No  Meds-to-Beds request:        [facility] #[id number] - [place] [facility] [address] # [address] [phone]  [address] [facility] # [address]  Phone: [phone] Fax: [phone]    [facility], [address] [facility] [phone] - F [phone]  [address] [place] [address]  Phone: [phone] Fax: [phone]      Notes:    Factors facilitating achievement of predicted outcomes: Family support, Motivated, Cooperative, and Pleasant    Barriers to discharge: Derald Stefan in place, pain management, IV ABX, POD#1    Additional Case Management Notes: CM met with pt at bedside. Pt is from [facility]. Pt reports her  will be driving the pt back to their home at discharge. Pt is interested in home nursing services for support with the prevena wound vac should the pt discharge with this in place. Pt reports her primary care physician is Dr. Niall Schulz and his number is [phone] with a fax number of [phone]. CM spoke with Rip Cobos who is going to attempt to identify a [facility] agency in the pt's home area. Pt identified no other CM needs at this time. CM will continue to follow for discharge planning. The Plan for Transition of Care is related to the following treatment goals of Recurrent incisional hernia [K43.2]  Panniculitis [M79.3]  Incisional hernia of anterior abdominal wall without obstruction or gangrene [T98.3]    IF APPLICABLE: The Patient and/or patient representative Gustavo Guevara and her family were provided with a choice of provider and agrees with the discharge plan.  Freedom of choice list with basic dialogue that supports the patient's individualized plan of care/goals and shares the quality data associated with the providers was provided to: (P) Patient   Patient Representative Name:       The Patient and/or Patient Representative Agree with the Discharge Plan?  (P) Yes    Tomy Peres, [facility]  Case Management Department  Ph: [phone] Fax: [phone] Hatchet Flap Text: The defect edges were debeveled with a #15 scalpel blade.  Given the location of the defect, shape of the defect and the proximity to free margins a hatchet flap was deemed most appropriate.  Using a sterile surgical marker, an appropriate hatchet flap was drawn incorporating the defect and placing the expected incisions within the relaxed skin tension lines where possible.    The area thus outlined was incised deep to adipose tissue with a #15 scalpel blade.  The skin margins were undermined to an appropriate distance in all directions utilizing iris scissors.

## (undated) DEVICE — SUTURE VCRL SZ 2-0 L27IN ABSRB VLT SH L26MM 1/2 CIR J785G

## (undated) DEVICE — BLANKET WRM W29.9XL79.1IN UP BODY FORC AIR MISTRAL-AIR

## (undated) DEVICE — SPONGE,LAP,18"X18",DLX,XR,ST,5/PK,40/PK: Brand: MEDLINE

## (undated) DEVICE — GENERAL: Brand: MEDLINE INDUSTRIES, INC.

## (undated) DEVICE — GLOVE ORANGE PI 7   MSG9070

## (undated) DEVICE — SUTURE PERMAHAND SZ 3-0 L18IN NONABSORBABLE BLK L26MM SH C013D

## (undated) DEVICE — SUTURE VCRL SZ 0 L27IN ABSRB UD L36MM CT-1 1/2 CIR J260H

## (undated) DEVICE — INTENDED FOR TISSUE SEPARATION, AND OTHER PROCEDURES THAT REQUIRE A SHARP SURGICAL BLADE TO PUNCTURE OR CUT.: Brand: BARD-PARKER ® CARBON RIB-BACK BLADES

## (undated) DEVICE — PLASMABLADE PS210-030S 3.0S LOCK: Brand: PLASMABLADE™

## (undated) DEVICE — GLOVE ORANGE PI 8 1/2   MSG9085

## (undated) DEVICE — TOWEL,OR,DSP,ST,BLUE,DLX,8/PK,10PK/CS: Brand: MEDLINE

## (undated) DEVICE — SUTURE PDS II SZ 1 L27IN ABSRB VLT CT-1 L36MM 1/2 CIR Z341H

## (undated) DEVICE — Device

## (undated) DEVICE — 3M™ TEGADERM™ TRANSPARENT FILM DRESSING FRAME STYLE, 1624W, 2-3/8 IN X 2-3/4 IN (6 CM X 7 CM), 100/CT 4CT/CASE: Brand: 3M™ TEGADERM™

## (undated) DEVICE — TUBING, SUCTION, 9/32" X 12', STRAIGHT: Brand: MEDLINE INDUSTRIES, INC.

## (undated) DEVICE — SUTURE VCRL SZ 0 L18IN ABSRB UD L36MM CT-1 1/2 CIR J840D

## (undated) DEVICE — RETRACTOR SURG WIDE FLAT LO PROF LTWT PHOTONGUIDE

## (undated) DEVICE — BINDER ABD UNIV H9IN WAIST 30-45IN E SFT COT PREM 3 PNL

## (undated) DEVICE — TOTAL TRAY, 16FR 10ML SIL FOLEY, URN: Brand: MEDLINE

## (undated) DEVICE — SUTURE PDS II SZ 1 L96IN ABSRB VLT TP-1 L65MM 1/2 CIR Z880G

## (undated) DEVICE — AGENT HEMSTAT 3GM OXIDIZED REGENERATED CELOS ABSRB FOR CONT (ORDER MULTIPLES OF 5EA)

## (undated) DEVICE — DRAPE,LAP,CHOLE,W/TROUGHS,STERILE: Brand: MEDLINE

## (undated) DEVICE — COVER,MAYO STAND,XL,STERILE: Brand: MEDLINE

## (undated) DEVICE — SUTURE STRATAFIX SPRL MCRYL + SZ 3-0 L18IN ABSRB UD PS-2 SXMP1B107

## (undated) DEVICE — SUTURE VCRL SZ 4-0 L27IN ABSRB UD L26MM SH 1/2 CIR J415H

## (undated) DEVICE — 34" SINGLE PATIENT USE HOVERMATT BREATHABLE: Brand: SINGLE PATIENT USE HOVERMATT

## (undated) DEVICE — E-Z CLEAN, NON-STICK, PTFE COATED, ELECTROSURGICAL BLADE ELECTRODE, MODIFIED EXTENDED INSULATION, 6.5 INCH (16.5 CM): Brand: MEGADYNE

## (undated) DEVICE — SOLUTION IV 1000ML 0.9% SOD CHL

## (undated) DEVICE — DRESSING GERM DIA1IN CNTR H DIA7MM BLU CHG ANTIMIC PROTCT

## (undated) DEVICE — APPLIER LIG CLP M L11IN TI STR RNG HNDL FOR 20 CLP DISP

## (undated) DEVICE — BLADE ES ELASTOMERIC COAT INSUL DURABLE BEND UPTO 90DEG

## (undated) DEVICE — BLADE CLIPPER SURG SENSICLIP

## (undated) DEVICE — SUTURE VCRL SZ 2-0 L18IN ABSRB VLT L26MM SH 1/2 CIR J775D

## (undated) DEVICE — 1010 S-DRAPE TOWEL DRAPE 10/BX: Brand: STERI-DRAPE™

## (undated) DEVICE — TOWEL,OR,DSP,ST,GREEN,DLX,XR,4/PK,20PK/C: Brand: MEDLINE

## (undated) DEVICE — SYSTEM SKIN CLOSURE 42CM DERMABOND PRINEO

## (undated) DEVICE — SUTURE VCRL SZ 3-0 L18IN ABSRB UD L26MM SH 1/2 CIR J864D

## (undated) DEVICE — SUTURE ETHLN SZ 3-0 L18IN NONABSORBABLE BLK FS-1 L24MM 3/8 663H

## (undated) DEVICE — PADDING CAST W20XL29.8IN FOAM SELF ADH M SUPP LTWT RESTON

## (undated) DEVICE — SUTURE PDS II SZ 1 L36IN ABSRB VLT L48MM CTX 1/2 CIR Z371T

## (undated) DEVICE — DRAPE IRRIG FLD WRM W44XL44IN W/ AORN STD PRTBL INTRATEMP

## (undated) DEVICE — APPLICATOR MEDICATED 26 CC SOLUTION HI LT ORNG CHLORAPREP

## (undated) DEVICE — GLOVE ORANGE PI 7 1/2   MSG9075

## (undated) DEVICE — SPONGE,PEANUT,XRAY,ST,SM,3/8",5/CARD: Brand: MEDLINE INDUSTRIES, INC.

## (undated) DEVICE — CLEANER,CAUTERY TIP,2X2",STERILE: Brand: MEDLINE

## (undated) DEVICE — SUTURE VCRL SZ 0 L27IN ABSRB UD L26MM CT-2 1/2 CIR J270H

## (undated) DEVICE — TOWEL,STOP FLAG GOLD N-W: Brand: MEDLINE

## (undated) DEVICE — DRAIN SURG 15FR L3 16IN DIA47MM SIL RND HUBLESS FULL FLUT

## (undated) DEVICE — GLOVE SURG SZ 75 L12IN THK75MIL DK GRN LTX FREE

## (undated) DEVICE — 1000 S-DRAPE TOWEL DRAPE 10/BX: Brand: STERI-DRAPE™

## (undated) DEVICE — STAPLER SKIN H3.9MM WIRE DIA0.58MM CRWN 6.9MM 35 STPL ROT

## (undated) DEVICE — INTENDED USE FOR SURGICAL MARKING ON INTACT SKIN, ALSO PROVIDES A PERMANENT METHOD OF IDENTIFYING OBJECTS IN THE OPERATING ROOM: Brand: WRITESITE® PLUS MINI PREP RESISTANT MARKER

## (undated) DEVICE — DRESSING,GAUZE,XEROFORM,CURAD,1"X8",ST: Brand: CURAD

## (undated) DEVICE — PLASTIC MAJOR: Brand: MEDLINE INDUSTRIES, INC.

## (undated) DEVICE — STANDARD HYPODERMIC NEEDLE,POLYPROPYLENE HUB: Brand: MONOJECT

## (undated) DEVICE — BINDER ABD H12IN FOR 30-45IN WAIST UNIV 4 PNL PREM DSGN E

## (undated) DEVICE — PREVENA PLUS INCISION MANAGEMENT SYSTEM: Brand: PREVENA PLUS™

## (undated) DEVICE — FLUID TRAP FOR MINIVAC ES EQUIP FLD TRAP

## (undated) DEVICE — SUTURE MCRYL SZ 3-0 L27IN ABSRB UD L19MM PS-2 3/8 CIR PRIM Y427H

## (undated) DEVICE — ALGICELL AG CALCIUM ALGINATE SILVER 4"X 5": Brand: ALGICELL® AG